# Patient Record
Sex: MALE | Race: WHITE | NOT HISPANIC OR LATINO | URBAN - METROPOLITAN AREA
[De-identification: names, ages, dates, MRNs, and addresses within clinical notes are randomized per-mention and may not be internally consistent; named-entity substitution may affect disease eponyms.]

---

## 2018-02-15 ENCOUNTER — OUTPATIENT (OUTPATIENT)
Dept: OUTPATIENT SERVICES | Facility: HOSPITAL | Age: 63
LOS: 1 days | Discharge: HOME | End: 2018-02-15

## 2018-02-15 DIAGNOSIS — M25.532 PAIN IN LEFT WRIST: ICD-10-CM

## 2019-03-19 PROBLEM — Z00.00 ENCOUNTER FOR PREVENTIVE HEALTH EXAMINATION: Status: ACTIVE | Noted: 2019-03-19

## 2019-04-04 ENCOUNTER — APPOINTMENT (OUTPATIENT)
Dept: CARDIOLOGY | Facility: CLINIC | Age: 64
End: 2019-04-04
Payer: COMMERCIAL

## 2019-04-04 VITALS
DIASTOLIC BLOOD PRESSURE: 90 MMHG | HEIGHT: 68 IN | SYSTOLIC BLOOD PRESSURE: 130 MMHG | WEIGHT: 219 LBS | BODY MASS INDEX: 33.19 KG/M2

## 2019-04-04 DIAGNOSIS — Z85.038 PERSONAL HISTORY OF OTHER MALIGNANT NEOPLASM OF LARGE INTESTINE: ICD-10-CM

## 2019-04-04 PROCEDURE — 99214 OFFICE O/P EST MOD 30 MIN: CPT

## 2019-04-04 NOTE — HISTORY OF PRESENT ILLNESS
[FreeTextEntry1] : pt is feeling well \par  meds reviwewd\par  no chest pain\par  f bs at home reviewed they are good 110 to130

## 2019-05-09 ENCOUNTER — EMERGENCY (EMERGENCY)
Facility: HOSPITAL | Age: 64
LOS: 0 days | Discharge: HOME | End: 2019-05-09
Attending: EMERGENCY MEDICINE | Admitting: EMERGENCY MEDICINE
Payer: COMMERCIAL

## 2019-05-09 VITALS
WEIGHT: 216.05 LBS | SYSTOLIC BLOOD PRESSURE: 148 MMHG | OXYGEN SATURATION: 97 % | DIASTOLIC BLOOD PRESSURE: 104 MMHG | TEMPERATURE: 97 F | HEIGHT: 68 IN | HEART RATE: 70 BPM | RESPIRATION RATE: 19 BRPM

## 2019-05-09 DIAGNOSIS — Z90.49 ACQUIRED ABSENCE OF OTHER SPECIFIED PARTS OF DIGESTIVE TRACT: Chronic | ICD-10-CM

## 2019-05-09 DIAGNOSIS — Z90.49 ACQUIRED ABSENCE OF OTHER SPECIFIED PARTS OF DIGESTIVE TRACT: ICD-10-CM

## 2019-05-09 DIAGNOSIS — Z85.038 PERSONAL HISTORY OF OTHER MALIGNANT NEOPLASM OF LARGE INTESTINE: ICD-10-CM

## 2019-05-09 DIAGNOSIS — L03.011 CELLULITIS OF RIGHT FINGER: ICD-10-CM

## 2019-05-09 DIAGNOSIS — M79.644 PAIN IN RIGHT FINGER(S): ICD-10-CM

## 2019-05-09 DIAGNOSIS — Z79.899 OTHER LONG TERM (CURRENT) DRUG THERAPY: ICD-10-CM

## 2019-05-09 DIAGNOSIS — Z79.2 LONG TERM (CURRENT) USE OF ANTIBIOTICS: ICD-10-CM

## 2019-05-09 DIAGNOSIS — Z79.84 LONG TERM (CURRENT) USE OF ORAL HYPOGLYCEMIC DRUGS: ICD-10-CM

## 2019-05-09 DIAGNOSIS — Z85.118 PERSONAL HISTORY OF OTHER MALIGNANT NEOPLASM OF BRONCHUS AND LUNG: ICD-10-CM

## 2019-05-09 DIAGNOSIS — I10 ESSENTIAL (PRIMARY) HYPERTENSION: ICD-10-CM

## 2019-05-09 DIAGNOSIS — Z79.82 LONG TERM (CURRENT) USE OF ASPIRIN: ICD-10-CM

## 2019-05-09 PROCEDURE — 99284 EMERGENCY DEPT VISIT MOD MDM: CPT

## 2019-05-09 RX ORDER — AZTREONAM 2 G
1 VIAL (EA) INJECTION
Qty: 20 | Refills: 0
Start: 2019-05-09 | End: 2019-05-18

## 2019-05-09 RX ORDER — AMPICILLIN SODIUM AND SULBACTAM SODIUM 250; 125 MG/ML; MG/ML
3 INJECTION, POWDER, FOR SUSPENSION INTRAMUSCULAR; INTRAVENOUS ONCE
Refills: 0 | Status: COMPLETED | OUTPATIENT
Start: 2019-05-09 | End: 2019-05-09

## 2019-05-09 RX ADMIN — AMPICILLIN SODIUM AND SULBACTAM SODIUM 200 GRAM(S): 250; 125 INJECTION, POWDER, FOR SUSPENSION INTRAMUSCULAR; INTRAVENOUS at 10:12

## 2019-05-09 NOTE — ED PROVIDER NOTE - OBJECTIVE STATEMENT
64 y/o NIDDM male presents with swelling and redness to right finger x few days. patient was gardening and treating wood in backyard prior to onset of symptoms. patient on keflex x 2 days without any improvement of symptoms. patient seen by plastic surgeon this am and was sent to the ED for IV antibx and procedure. patient denies any fever,chills, streaking up arm. patient c/o throbbing sensation. no spontaneous drainage from area.

## 2019-05-09 NOTE — ED PROVIDER NOTE - PHYSICAL EXAMINATION
msk: right hand no bony tenderness to fingers , no crepitation, good rom of digits    skin: right hand finger - +swelling , erythema with central papule, no crepitation    heme: no lymphangitis

## 2019-05-09 NOTE — ED PROVIDER NOTE - NS ED ROS FT
Review of Systems    Constitutional: (-) fever or chills  respiratory: (-) cough (-) shortness of breath  Cardiovascular: (-) syncope, palpitations or chest pain  Integumentary: (+) redness and throbbing to fingertip  Neurological: (-) altered mental status, headache or head injury  heme: no streaking or lymphnodes

## 2019-05-09 NOTE — ED PROVIDER NOTE - CARE PROVIDER_API CALL
Dc Lopez (DO)  Plastic Surgery  [address] [place] [address]  Phone: [phone]  Fax: [phone]  Follow Up Time:

## 2019-05-09 NOTE — ED ADULT NURSE NOTE - NSIMPLEMENTINTERV_GEN_ALL_ED
Implemented All Universal Safety Interventions:  [place] to call system. Call bell, personal items and telephone within reach. Instruct patient to call for assistance. Room bathroom lighting operational. Non-slip footwear when patient is off stretcher. Physically safe environment: no spills, clutter or unnecessary equipment. Stretcher in lowest position, wheels locked, appropriate side rails in place.

## 2019-05-09 NOTE — ED PROVIDER NOTE - NSFOLLOWUPINSTRUCTIONS_ED_ALL_ED_FT
Fingertip Infection    There are two main types of fingertip infections:  Paronychia. This is an infection that happens around your nail. This type of infection can start suddenly in one nail or occur gradually over time and affect more than one nail. Long-term (chronic) paronychia can make your fingernails thick and deformed.  Felon. This is a bacterial infection in the padded tip of your finger. Felon infection can cause a painful collection of pus (abscess) to form inside your fingertip. If the infection is not treated, the infection can spread as deep as the bone.  What are the causes?  Paronychia infection can be caused by bacteria, funguses, or a mix of both. Felon infection is usually caused by the bacteria that are normally found on your skin. An infection can develop if the bacteria spread through your skin to the pad of tissue inside your fingertip.    What increases the risk?  A fingertip infection is more likely to develop in people:  Who have diabetes.  Who have a weak body defense (immune) system.  Who work with their hands.  Whose hands are exposed to moisture, chemicals, or irritants for long periods of time.  Who have poor circulation.  Who bite, chew, or pick their fingernails.  What are the signs or symptoms?  Symptoms of paronychia may affect one or more fingernails and include:  Pain, swelling, and redness around the nail.  Pus-filled pockets at the base or side of the fingernail (cuticle).  Thick fingernails that separate from the nail bed.  Pus that drains from the nail bed.  Symptoms of felon usually affect just one fingertip pad and include:  Severe, throbbing pain.  Redness.  Swelling.  Warmth.  Tenderness when the affected fingertip is touched.  How is this diagnosed?  A fingertip infection is diagnosed with a medical history and physical exam. If there is pus draining from the infection, it may be swabbed and sent to the lab for a culture. An X-ray may be done to see if the infection has spread to the bone.    How is this treated?  Treatment for a fingertip infection may include:  Warm water or salt–water soaks several times per day.  Antibiotic medicine. This may be an ointment or pills.  Steroid ointment.  Antifungal pills.  Drainage of pus pockets. This is done by making a surgical cut (incision) to open the fingertip to drain pus.  Wearing gloves to protect your nails.  Follow these instructions at home:  Medicines     Take or apply over-the-counter and prescription medicines only as told by your health care provider.  If you were prescribed antibiotic medicine, take or apply it as told by your health care provider. Do not stop using the antibiotic even if your condition improves.  Wound care     Clean the infected area each day with warm water or salt water, or as told by your health care provider.  Gently wash the infected area with mild soap and water.  Rinse the infected area with water to remove all soap.  Pat the infected area dry with a clean towel. Do not rub it.  To make a salt–water mixture, completely dissolve ½–1 tsp of salt in 1 cup of warm water.  Follow instructions from your health care provider about:  How to take care of the infection.  When and how you should change your bandage (dressing).  When you should remove your dressing.  Check the infected area every day for more signs of infection. Watch for:  More redness, swelling, or pain.  More fluid or blood.  Warmth.  A bad smell.  General instructions     Keep the dressing dry until your health care provider says it can be removed. Do not take baths, swim, use a hot tub, or do anything that would put your wound underwater until your health care provider approves.  Raise (elevate) the infected area above the level of your heart while you are sitting or lying down or as told by your health care provider.  Do not scratch or pick at the infection.  Wear gloves as told by your health care provider, if this applies.  Keep all follow-up visits as told by your health care provider. This is important.  How is this prevented?  Wear gloves when you work with your hands.  Wash your hands often with antibacterial soap.  Avoid letting your hands stay wet or irritated for long periods of time.  Do not bite your fingernails. Do not pull on your cuticles. Do not suck on your fingers.  Use clean scissors or nail clippers to trim your nails. Do not cut your fingernails very short.  Contact a health care provider if:  Your pain medicine is not helping.  You have more redness, swelling, or pain at your fingertip.  You continue to have fluid, blood, or pus coming from your fingertip.  Your infection area feels warm to the touch.  You continue to notice a bad smell coming from your fingertip or your dressing.  Get help right away if:  The area of redness is spreading, or you notice a red streak going away from your fingertip.  You have a fever.  This information is not intended to replace advice given to you by your health care provider. Make sure you discuss any questions you have with your health care provider.

## 2019-05-13 LAB
-  AMIKACIN: SIGNIFICANT CHANGE UP
-  AZTREONAM: SIGNIFICANT CHANGE UP
-  CEFEPIME: SIGNIFICANT CHANGE UP
-  CEFTRIAXONE: SIGNIFICANT CHANGE UP
-  CIPROFLOXACIN: SIGNIFICANT CHANGE UP
-  GENTAMICIN: SIGNIFICANT CHANGE UP
-  LEVOFLOXACIN: SIGNIFICANT CHANGE UP
-  MEROPENEM: SIGNIFICANT CHANGE UP
-  PIPERACILLIN/TAZOBACTAM: SIGNIFICANT CHANGE UP
-  TOBRAMYCIN: SIGNIFICANT CHANGE UP
-  TRIMETHOPRIM/SULFAMETHOXAZOLE: SIGNIFICANT CHANGE UP
METHOD TYPE: SIGNIFICANT CHANGE UP

## 2019-05-14 LAB
-  AMPICILLIN/SULBACTAM: SIGNIFICANT CHANGE UP
-  CEFAZOLIN: SIGNIFICANT CHANGE UP
-  CLINDAMYCIN: SIGNIFICANT CHANGE UP
-  ERYTHROMYCIN: SIGNIFICANT CHANGE UP
-  GENTAMICIN: SIGNIFICANT CHANGE UP
-  OXACILLIN: SIGNIFICANT CHANGE UP
-  PENICILLIN: SIGNIFICANT CHANGE UP
-  RIFAMPIN: SIGNIFICANT CHANGE UP
-  TETRACYCLINE: SIGNIFICANT CHANGE UP
-  TRIMETHOPRIM/SULFAMETHOXAZOLE: SIGNIFICANT CHANGE UP
-  VANCOMYCIN: SIGNIFICANT CHANGE UP
CULTURE RESULTS: SIGNIFICANT CHANGE UP
METHOD TYPE: SIGNIFICANT CHANGE UP
ORGANISM # SPEC MICROSCOPIC CNT: SIGNIFICANT CHANGE UP
SPECIMEN SOURCE: SIGNIFICANT CHANGE UP

## 2019-05-14 NOTE — ED POST DISCHARGE NOTE - RESULT SUMMARY
ABSCESS CX FINGER- FINGER HEALING WELL. WILL SEE SURGEON TOMORROW TO DECIDE IF ABX NEED TO BE CHANGED. CX DISCUSSED, PATIENT DOING MUCH BETTER.

## 2019-06-25 ENCOUNTER — OUTPATIENT (OUTPATIENT)
Dept: OUTPATIENT SERVICES | Facility: HOSPITAL | Age: 64
LOS: 1 days | Discharge: HOME | End: 2019-06-25

## 2019-06-25 ENCOUNTER — RX RENEWAL (OUTPATIENT)
Age: 64
End: 2019-06-25

## 2019-06-25 DIAGNOSIS — I10 ESSENTIAL (PRIMARY) HYPERTENSION: ICD-10-CM

## 2019-06-25 DIAGNOSIS — E11.9 TYPE 2 DIABETES MELLITUS WITHOUT COMPLICATIONS: ICD-10-CM

## 2019-06-25 DIAGNOSIS — Z90.49 ACQUIRED ABSENCE OF OTHER SPECIFIED PARTS OF DIGESTIVE TRACT: Chronic | ICD-10-CM

## 2019-06-25 DIAGNOSIS — Z00.00 ENCOUNTER FOR GENERAL ADULT MEDICAL EXAMINATION WITHOUT ABNORMAL FINDINGS: ICD-10-CM

## 2019-06-25 DIAGNOSIS — E78.5 HYPERLIPIDEMIA, UNSPECIFIED: ICD-10-CM

## 2019-06-26 LAB
ALBUMIN SERPL ELPH-MCNC: 4.7 G/DL
ALP BLD-CCNC: 66 U/L
ALT SERPL-CCNC: 26 U/L
ANION GAP SERPL CALC-SCNC: 15 MMOL/L
APPEARANCE: ABNORMAL
AST SERPL-CCNC: 20 U/L
BACTERIA: NEGATIVE
BASOPHILS # BLD AUTO: 0.08 K/UL
BASOPHILS NFR BLD AUTO: 1.2 %
BILIRUB SERPL-MCNC: 0.4 MG/DL
BILIRUBIN URINE: NEGATIVE
BLOOD URINE: NEGATIVE
BUN SERPL-MCNC: 25 MG/DL
CALCIUM SERPL-MCNC: 9.6 MG/DL
CHLORIDE SERPL-SCNC: 104 MMOL/L
CHOLEST SERPL-MCNC: 167 MG/DL
CHOLEST/HDLC SERPL: 3.6 RATIO
CO2 SERPL-SCNC: 24 MMOL/L
COLOR: YELLOW
CREAT SERPL-MCNC: 1.1 MG/DL
CREAT SPEC-SCNC: 159 MG/DL
EOSINOPHIL # BLD AUTO: 0.21 K/UL
EOSINOPHIL NFR BLD AUTO: 3.1 %
ESTIMATED AVERAGE GLUCOSE: 114 MG/DL
GLUCOSE QUALITATIVE U: NEGATIVE
GLUCOSE SERPL-MCNC: 95 MG/DL
HBA1C MFR BLD HPLC: 5.6 %
HCT VFR BLD CALC: 46.5 %
HDLC SERPL-MCNC: 46 MG/DL
HGB BLD-MCNC: 15.6 G/DL
HYALINE CASTS: 47 /LPF
IMM GRANULOCYTES NFR BLD AUTO: 0.4 %
KETONES URINE: NEGATIVE
LDLC SERPL CALC-MCNC: 115 MG/DL
LEUKOCYTE ESTERASE URINE: NEGATIVE
LYMPHOCYTES # BLD AUTO: 2.41 K/UL
LYMPHOCYTES NFR BLD AUTO: 36 %
MAN DIFF?: NORMAL
MCHC RBC-ENTMCNC: 29.2 PG
MCHC RBC-ENTMCNC: 33.5 G/DL
MCV RBC AUTO: 86.9 FL
MICROALBUMIN 24H UR DL<=1MG/L-MCNC: 195.8 MG/DL
MICROALBUMIN/CREAT 24H UR-RTO: 1229 MG/G
MICROSCOPIC-UA: NORMAL
MONOCYTES # BLD AUTO: 0.58 K/UL
MONOCYTES NFR BLD AUTO: 8.7 %
NEUTROPHILS # BLD AUTO: 3.38 K/UL
NEUTROPHILS NFR BLD AUTO: 50.6 %
NITRITE URINE: NEGATIVE
PH URINE: 5.5
PLATELET # BLD AUTO: 223 K/UL
POTASSIUM SERPL-SCNC: 4 MMOL/L
PROT SERPL-MCNC: 7 G/DL
PROTEIN URINE: ABNORMAL
PSA SERPL-MCNC: 1.58 NG/ML
RBC # BLD: 5.35 M/UL
RBC # FLD: 13.1 %
RED BLOOD CELLS URINE: 0 /HPF
SODIUM SERPL-SCNC: 143 MMOL/L
SPECIFIC GRAVITY URINE: 1.03
SQUAMOUS EPITHELIAL CELLS: 1 /HPF
TRIGL SERPL-MCNC: 113 MG/DL
UROBILINOGEN URINE: NORMAL
WBC # FLD AUTO: 6.69 K/UL
WHITE BLOOD CELLS URINE: 1 /HPF

## 2019-07-09 ENCOUNTER — APPOINTMENT (OUTPATIENT)
Dept: CARDIOLOGY | Facility: CLINIC | Age: 64
End: 2019-07-09
Payer: COMMERCIAL

## 2019-07-09 VITALS
WEIGHT: 220 LBS | DIASTOLIC BLOOD PRESSURE: 70 MMHG | SYSTOLIC BLOOD PRESSURE: 118 MMHG | BODY MASS INDEX: 33.34 KG/M2 | HEIGHT: 68 IN

## 2019-07-09 PROCEDURE — 99213 OFFICE O/P EST LOW 20 MIN: CPT

## 2019-07-09 NOTE — REASON FOR VISIT
[Hyperlipidemia] : hyperlipidemia [Follow-Up - Clinic] : a clinic follow-up of [Hypertension] : hypertension

## 2019-07-09 NOTE — HISTORY OF PRESENT ILLNESS
[FreeTextEntry1] :  pt is feeling well\par  nochest pains\par  diabetes is better hgb a1c 5.6\par  cbc amp and lipids done on 6/25 are wnl\par

## 2019-07-09 NOTE — PHYSICAL EXAM
[General Appearance - Well Developed] : well developed [Normal Appearance] : normal appearance [General Appearance - Well Nourished] : well nourished [Well Groomed] : well groomed [No Deformities] : no deformities [General Appearance - In No Acute Distress] : no acute distress

## 2019-10-17 ENCOUNTER — APPOINTMENT (OUTPATIENT)
Dept: CARDIOLOGY | Facility: CLINIC | Age: 64
End: 2019-10-17
Payer: COMMERCIAL

## 2019-10-17 ENCOUNTER — RX RENEWAL (OUTPATIENT)
Age: 64
End: 2019-10-17

## 2019-10-17 VITALS
BODY MASS INDEX: 34.1 KG/M2 | WEIGHT: 225 LBS | HEIGHT: 68 IN | SYSTOLIC BLOOD PRESSURE: 132 MMHG | DIASTOLIC BLOOD PRESSURE: 85 MMHG

## 2019-10-17 PROCEDURE — 93000 ELECTROCARDIOGRAM COMPLETE: CPT

## 2019-10-17 PROCEDURE — 99213 OFFICE O/P EST LOW 20 MIN: CPT

## 2019-10-17 NOTE — PHYSICAL EXAM
[General Appearance - Well Developed] : well developed [Normal Appearance] : normal appearance [Well Groomed] : well groomed [No Deformities] : no deformities [General Appearance - Well Nourished] : well nourished [General Appearance - In No Acute Distress] : no acute distress [Heart Rate And Rhythm] : heart rate and rhythm were normal [Heart Sounds] : normal S1 and S2 [Murmurs] : no murmurs present [Arterial Pulses Normal] : the arterial pulses were normal [Edema] : no peripheral edema present [Veins - Varicosity Changes] : no varicosital changes were noted in the lower extremities

## 2019-10-17 NOTE — ASSESSMENT
[FreeTextEntry1] : e k g nsr wnl\par  bp is well controlled\par  meds reviewed\par  need s blood work

## 2019-10-18 RX ORDER — AMLODIPINE BESYLATE 5 MG/1
5 TABLET ORAL TWICE DAILY
Qty: 180 | Refills: 2 | Status: DISCONTINUED | COMMUNITY
Start: 2019-10-17 | End: 2019-10-18

## 2019-10-18 RX ORDER — AMLODIPINE BESYLATE 5 MG/1
5 TABLET ORAL
Qty: 90 | Refills: 2 | Status: DISCONTINUED | COMMUNITY
End: 2019-10-18

## 2019-12-26 ENCOUNTER — RX RENEWAL (OUTPATIENT)
Age: 64
End: 2019-12-26

## 2019-12-30 ENCOUNTER — LABORATORY RESULT (OUTPATIENT)
Age: 64
End: 2019-12-30

## 2019-12-30 ENCOUNTER — OUTPATIENT (OUTPATIENT)
Dept: OUTPATIENT SERVICES | Facility: HOSPITAL | Age: 64
LOS: 1 days | Discharge: HOME | End: 2019-12-30

## 2019-12-30 DIAGNOSIS — I10 ESSENTIAL (PRIMARY) HYPERTENSION: ICD-10-CM

## 2019-12-30 DIAGNOSIS — Z00.00 ENCOUNTER FOR GENERAL ADULT MEDICAL EXAMINATION WITHOUT ABNORMAL FINDINGS: ICD-10-CM

## 2019-12-30 DIAGNOSIS — E78.5 HYPERLIPIDEMIA, UNSPECIFIED: ICD-10-CM

## 2019-12-30 DIAGNOSIS — Z90.49 ACQUIRED ABSENCE OF OTHER SPECIFIED PARTS OF DIGESTIVE TRACT: Chronic | ICD-10-CM

## 2019-12-30 DIAGNOSIS — C18.9 MALIGNANT NEOPLASM OF COLON, UNSPECIFIED: ICD-10-CM

## 2020-01-09 ENCOUNTER — APPOINTMENT (OUTPATIENT)
Dept: CARDIOLOGY | Facility: CLINIC | Age: 65
End: 2020-01-09
Payer: COMMERCIAL

## 2020-01-09 VITALS
HEIGHT: 68 IN | DIASTOLIC BLOOD PRESSURE: 70 MMHG | BODY MASS INDEX: 35.16 KG/M2 | WEIGHT: 232 LBS | SYSTOLIC BLOOD PRESSURE: 123 MMHG

## 2020-01-09 PROCEDURE — 99213 OFFICE O/P EST LOW 20 MIN: CPT

## 2020-01-09 NOTE — ASSESSMENT
[FreeTextEntry1] :  diabetes is well controlled\par  cholesterol is still high will increase pravastatin 40\par  pt brither is diagnosed with ca prostate will check psa

## 2020-01-09 NOTE — PHYSICAL EXAM
[General Appearance - Well Developed] : well developed [Normal Appearance] : normal appearance [Well Groomed] : well groomed [General Appearance - Well Nourished] : well nourished [No Deformities] : no deformities [Heart Rate And Rhythm] : heart rate and rhythm were normal [General Appearance - In No Acute Distress] : no acute distress [Heart Sounds] : normal S1 and S2 [Murmurs] : no murmurs present

## 2020-01-09 NOTE — HISTORY OF PRESENT ILLNESS
[FreeTextEntry1] : pt is feeling well\par  no chest pains\par  blood work 12/30/19 revirwd wnl\par  hgba1c 5.9 diabetes is well controlled

## 2020-04-09 ENCOUNTER — APPOINTMENT (OUTPATIENT)
Dept: CARDIOLOGY | Facility: CLINIC | Age: 65
End: 2020-04-09

## 2020-06-23 ENCOUNTER — RX RENEWAL (OUTPATIENT)
Age: 65
End: 2020-06-23

## 2020-09-03 ENCOUNTER — LABORATORY RESULT (OUTPATIENT)
Age: 65
End: 2020-09-03

## 2020-09-03 LAB
ALBUMIN SERPL ELPH-MCNC: 4.8 G/DL
ALP BLD-CCNC: 62 U/L
ALT SERPL-CCNC: 59 U/L
ANION GAP SERPL CALC-SCNC: 19 MMOL/L
AST SERPL-CCNC: 39 U/L
BASOPHILS # BLD AUTO: 0.07 K/UL
BASOPHILS NFR BLD AUTO: 1 %
BILIRUB SERPL-MCNC: 0.5 MG/DL
BUN SERPL-MCNC: 27 MG/DL
CALCIUM SERPL-MCNC: 10.3 MG/DL
CHLORIDE SERPL-SCNC: 99 MMOL/L
CHOLEST SERPL-MCNC: 214 MG/DL
CHOLEST/HDLC SERPL: 4.5 RATIO
CO2 SERPL-SCNC: 25 MMOL/L
CREAT SERPL-MCNC: 1.4 MG/DL
EOSINOPHIL # BLD AUTO: 0.29 K/UL
EOSINOPHIL NFR BLD AUTO: 4 %
GLUCOSE SERPL-MCNC: 91 MG/DL
HCT VFR BLD CALC: 50.6 %
HDLC SERPL-MCNC: 48 MG/DL
HGB BLD-MCNC: 16.7 G/DL
IMM GRANULOCYTES NFR BLD AUTO: 0.4 %
LDLC SERPL CALC-MCNC: 147 MG/DL
LYMPHOCYTES # BLD AUTO: 2.47 K/UL
LYMPHOCYTES NFR BLD AUTO: 34.2 %
MAN DIFF?: NORMAL
MCHC RBC-ENTMCNC: 29.1 PG
MCHC RBC-ENTMCNC: 33 G/DL
MCV RBC AUTO: 88.3 FL
MONOCYTES # BLD AUTO: 0.65 K/UL
MONOCYTES NFR BLD AUTO: 9 %
NEUTROPHILS # BLD AUTO: 3.71 K/UL
NEUTROPHILS NFR BLD AUTO: 51.4 %
PLATELET # BLD AUTO: 231 K/UL
POTASSIUM SERPL-SCNC: 4.3 MMOL/L
PROT SERPL-MCNC: 7.1 G/DL
RBC # BLD: 5.73 M/UL
RBC # FLD: 13.2 %
SODIUM SERPL-SCNC: 143 MMOL/L
TRIGL SERPL-MCNC: 153 MG/DL
WBC # FLD AUTO: 7.22 K/UL

## 2020-09-04 LAB
ESTIMATED AVERAGE GLUCOSE: 117 MG/DL
HBA1C MFR BLD HPLC: 5.7 %
PSA SERPL-MCNC: 1.86 NG/ML

## 2020-09-08 ENCOUNTER — RX RENEWAL (OUTPATIENT)
Age: 65
End: 2020-09-08

## 2020-09-14 ENCOUNTER — RX RENEWAL (OUTPATIENT)
Age: 65
End: 2020-09-14

## 2020-09-24 ENCOUNTER — APPOINTMENT (OUTPATIENT)
Dept: CARDIOLOGY | Facility: CLINIC | Age: 65
End: 2020-09-24
Payer: COMMERCIAL

## 2020-09-24 VITALS
WEIGHT: 220 LBS | TEMPERATURE: 97.3 F | BODY MASS INDEX: 33.45 KG/M2 | DIASTOLIC BLOOD PRESSURE: 70 MMHG | SYSTOLIC BLOOD PRESSURE: 118 MMHG

## 2020-09-24 PROCEDURE — 93000 ELECTROCARDIOGRAM COMPLETE: CPT

## 2020-09-24 PROCEDURE — 99213 OFFICE O/P EST LOW 20 MIN: CPT

## 2020-09-24 NOTE — HISTORY OF PRESENT ILLNESS
[FreeTextEntry1] : pt is feeling well\par  fbs 91\par  blood work from 9/3/20 reviewed hgba1c 5.7 well controlled\par  cholesterol elevated 213 ldl 143\par  medsd reviewed\par

## 2020-09-24 NOTE — ASSESSMENT
[FreeTextEntry1] : pt is feeling well\par  diabetes is better\par  htn well controlled\par  e k g nsr wnl\par  meds renewed

## 2020-10-05 ENCOUNTER — RX RENEWAL (OUTPATIENT)
Age: 65
End: 2020-10-05

## 2021-01-07 ENCOUNTER — RX RENEWAL (OUTPATIENT)
Age: 66
End: 2021-01-07

## 2021-01-13 LAB
ALBUMIN SERPL ELPH-MCNC: 4.8 G/DL
ALP BLD-CCNC: 65 U/L
ALT SERPL-CCNC: 45 U/L
AST SERPL-CCNC: 27 U/L
BILIRUB DIRECT SERPL-MCNC: <0.2 MG/DL
BILIRUB INDIRECT SERPL-MCNC: >0.3 MG/DL
BILIRUB SERPL-MCNC: 0.5 MG/DL
CHOLEST SERPL-MCNC: 183 MG/DL
ESTIMATED AVERAGE GLUCOSE: 120 MG/DL
HBA1C MFR BLD HPLC: 5.8 %
HDLC SERPL-MCNC: 48 MG/DL
LDLC SERPL CALC-MCNC: 112 MG/DL
NONHDLC SERPL-MCNC: 135 MG/DL
PROT SERPL-MCNC: 7.2 G/DL
SARS-COV-2 IGG SERPL IA-ACNC: <0.1 INDEX
SARS-COV-2 IGG SERPL QL IA: NEGATIVE
TRIGL SERPL-MCNC: 172 MG/DL

## 2021-01-31 ENCOUNTER — EMERGENCY (EMERGENCY)
Facility: HOSPITAL | Age: 66
LOS: 0 days | Discharge: HOME | End: 2021-01-31
Attending: EMERGENCY MEDICINE | Admitting: EMERGENCY MEDICINE
Payer: COMMERCIAL

## 2021-01-31 ENCOUNTER — TRANSCRIPTION ENCOUNTER (OUTPATIENT)
Age: 66
End: 2021-01-31

## 2021-01-31 VITALS
HEIGHT: 68 IN | WEIGHT: 212.08 LBS | OXYGEN SATURATION: 96 % | DIASTOLIC BLOOD PRESSURE: 71 MMHG | TEMPERATURE: 98 F | RESPIRATION RATE: 18 BRPM | HEART RATE: 79 BPM | SYSTOLIC BLOOD PRESSURE: 108 MMHG

## 2021-01-31 DIAGNOSIS — R53.81 OTHER MALAISE: ICD-10-CM

## 2021-01-31 DIAGNOSIS — Z87.891 PERSONAL HISTORY OF NICOTINE DEPENDENCE: ICD-10-CM

## 2021-01-31 DIAGNOSIS — Z20.822 CONTACT WITH AND (SUSPECTED) EXPOSURE TO COVID-19: ICD-10-CM

## 2021-01-31 DIAGNOSIS — B34.9 VIRAL INFECTION, UNSPECIFIED: ICD-10-CM

## 2021-01-31 DIAGNOSIS — Z90.49 ACQUIRED ABSENCE OF OTHER SPECIFIED PARTS OF DIGESTIVE TRACT: Chronic | ICD-10-CM

## 2021-01-31 DIAGNOSIS — Z79.82 LONG TERM (CURRENT) USE OF ASPIRIN: ICD-10-CM

## 2021-01-31 DIAGNOSIS — Z85.038 PERSONAL HISTORY OF OTHER MALIGNANT NEOPLASM OF LARGE INTESTINE: ICD-10-CM

## 2021-01-31 DIAGNOSIS — I10 ESSENTIAL (PRIMARY) HYPERTENSION: ICD-10-CM

## 2021-01-31 DIAGNOSIS — E11.9 TYPE 2 DIABETES MELLITUS WITHOUT COMPLICATIONS: ICD-10-CM

## 2021-01-31 DIAGNOSIS — Z79.84 LONG TERM (CURRENT) USE OF ORAL HYPOGLYCEMIC DRUGS: ICD-10-CM

## 2021-01-31 DIAGNOSIS — Z85.118 PERSONAL HISTORY OF OTHER MALIGNANT NEOPLASM OF BRONCHUS AND LUNG: ICD-10-CM

## 2021-01-31 DIAGNOSIS — Z79.2 LONG TERM (CURRENT) USE OF ANTIBIOTICS: ICD-10-CM

## 2021-01-31 PROCEDURE — 99284 EMERGENCY DEPT VISIT MOD MDM: CPT

## 2021-01-31 NOTE — ED PROVIDER NOTE - PATIENT PORTAL LINK FT
[place] grid at home. MVI/AREDS discussed. Patient to call if any changes in vision or grid card. You can access the FollowMyHealth Patient Portal offered by [facility] by registering at the following website: [email or web address]. By joining [facility] FollowMyHealth portal, you will also be able to view your health information using other applications (apps) compatible with our system.

## 2021-01-31 NOTE — ED PROVIDER NOTE - NSFOLLOWUPINSTRUCTIONS_ED_ALL_ED_FT
Information for COVID-19    You are being discharged with viral illness diagnosis and do not require hospitalization.  At this time, only patients who are being hospitalized are tested for COVID-19.    If you are well enough to be discharged home and are not in a high risk group to be admitted, you should care for yourself at home exactly like you would if you have Influenza “flu”. Follow all the standard guidelines about washing your hands, covering your cough, etc. If you feel unwell, stay home, rest and drink plenty of clear fluids. Keep track of your symptoms.    You do need to remain home for at least 7 days from the onset of symptoms or 3 days after your fever is completely gone and your respiratory symptoms are better, whichever is longer. You should continue to isolate yourself.     If symptoms worsen or continue and you need to seek medical care, call your healthcare provider in advance, or 9-1-1 in an emergency, and let them know you are a close contact to a person with confirmed COVID-19    You should return to the Emergency Department if you develop worse symptoms, trouble breathing, chest pain, and/or a fever that doesn’t improve with over the counter medications.    Please consider going through the drive-through testing unless you are severely ill and need to go to the ED.    -through testing is available at various location, including [place].  Call [facility] at  [phone] to make an appointment.    How to Set Up Your Home for Self-Quarantine or Self-Isolation    Please refer to this helpful video.    [email or web address]

## 2021-01-31 NOTE — ED PROVIDER NOTE - OBJECTIVE STATEMENT
64 yo male hx of HTN/DM/Lung/colon cancer in remission present c/o malaise/ rhinorrhea/subjective fever/chill over the past few days. Wife has similar symptoms at home. Received 1st dose of COVID vaccine 2.5 weeks ago.   Denies fever/chill/chest pain/palpitation/sob/abd pain/n/v/d/ black stool/bloody stool/urinary sxs

## 2021-01-31 NOTE — ED PROVIDER NOTE - ATTENDING CONTRIBUTION TO CARE
I personally evaluated the patient. I reviewed the Resident’s or Physician Assistant’s note (as assigned above), and agree with the findings and plan except as documented in my note.  Chart reviewed.  H/O colon CA, lung CA, HTN, complains of congestion and malaise. Received Moderna vaccine 2.5 weeks ago.  Exam unremarkable.

## 2021-01-31 NOTE — ED PROVIDER NOTE - NS ED ROS FT
Constitutional: see HPI  Eyes: no redness/discharge/pain/vision changes  ENT: + rhinorrhea No ear pain/sore throat  Cardiac: No chest pain, SOB or edema.  Respiratory: No cough or respiratory distress  GI: No nausea, vomiting, diarrhea or abdominal pain.  : No dysuria, frequency, urgency or hematuria  MS: no pain to back or extremities, no loss of ROM, no weakness  Neuro: No headache or weakness. No LOC.  Skin: No skin rash.  Endocrine: + diabetes.

## 2021-02-01 LAB — SARS-COV-2 RNA SPEC QL NAA+PROBE: DETECTED

## 2021-02-05 ENCOUNTER — APPOINTMENT (OUTPATIENT)
Dept: CARDIOLOGY | Facility: CLINIC | Age: 66
End: 2021-02-05

## 2021-02-07 ENCOUNTER — INPATIENT (INPATIENT)
Facility: HOSPITAL | Age: 66
LOS: 1 days | Discharge: HOME | End: 2021-02-09
Attending: HOSPITALIST | Admitting: HOSPITALIST
Payer: COMMERCIAL

## 2021-02-07 VITALS — WEIGHT: 229.94 LBS

## 2021-02-07 DIAGNOSIS — E78.5 HYPERLIPIDEMIA, UNSPECIFIED: ICD-10-CM

## 2021-02-07 DIAGNOSIS — E11.9 TYPE 2 DIABETES MELLITUS WITHOUT COMPLICATIONS: ICD-10-CM

## 2021-02-07 DIAGNOSIS — Z90.2 ACQUIRED ABSENCE OF LUNG [PART OF]: Chronic | ICD-10-CM

## 2021-02-07 DIAGNOSIS — N28.9 DISORDER OF KIDNEY AND URETER, UNSPECIFIED: ICD-10-CM

## 2021-02-07 DIAGNOSIS — R10.13 EPIGASTRIC PAIN: ICD-10-CM

## 2021-02-07 DIAGNOSIS — K82.8 OTHER SPECIFIED DISEASES OF GALLBLADDER: ICD-10-CM

## 2021-02-07 DIAGNOSIS — U07.1 COVID-19: ICD-10-CM

## 2021-02-07 DIAGNOSIS — Z90.49 ACQUIRED ABSENCE OF OTHER SPECIFIED PARTS OF DIGESTIVE TRACT: Chronic | ICD-10-CM

## 2021-02-07 DIAGNOSIS — I10 ESSENTIAL (PRIMARY) HYPERTENSION: ICD-10-CM

## 2021-02-07 LAB
ALBUMIN SERPL ELPH-MCNC: 4.7 G/DL — SIGNIFICANT CHANGE UP (ref 3.5–5.2)
ALP SERPL-CCNC: 82 U/L — SIGNIFICANT CHANGE UP (ref 30–115)
ALT FLD-CCNC: 39 U/L — SIGNIFICANT CHANGE UP (ref 0–41)
ANION GAP SERPL CALC-SCNC: 15 MMOL/L — HIGH (ref 7–14)
AST SERPL-CCNC: 30 U/L — SIGNIFICANT CHANGE UP (ref 0–41)
BASE EXCESS BLDV CALC-SCNC: 3.9 MMOL/L — HIGH (ref -2–2)
BASOPHILS # BLD AUTO: 0.09 K/UL — SIGNIFICANT CHANGE UP (ref 0–0.2)
BASOPHILS NFR BLD AUTO: 1 % — SIGNIFICANT CHANGE UP (ref 0–1)
BILIRUB SERPL-MCNC: 0.5 MG/DL — SIGNIFICANT CHANGE UP (ref 0.2–1.2)
BUN SERPL-MCNC: 25 MG/DL — HIGH (ref 10–20)
CA-I SERPL-SCNC: 1.28 MMOL/L — SIGNIFICANT CHANGE UP (ref 1.12–1.3)
CALCIUM SERPL-MCNC: 11.3 MG/DL — HIGH (ref 8.5–10.1)
CHLORIDE SERPL-SCNC: 102 MMOL/L — SIGNIFICANT CHANGE UP (ref 98–110)
CO2 SERPL-SCNC: 27 MMOL/L — SIGNIFICANT CHANGE UP (ref 17–32)
CREAT SERPL-MCNC: 1.8 MG/DL — HIGH (ref 0.7–1.5)
EOSINOPHIL # BLD AUTO: 0.12 K/UL — SIGNIFICANT CHANGE UP (ref 0–0.7)
EOSINOPHIL NFR BLD AUTO: 1.3 % — SIGNIFICANT CHANGE UP (ref 0–8)
GAS PNL BLDV: 144 MMOL/L — SIGNIFICANT CHANGE UP (ref 136–145)
GAS PNL BLDV: SIGNIFICANT CHANGE UP
GLUCOSE SERPL-MCNC: 192 MG/DL — HIGH (ref 70–99)
HCO3 BLDV-SCNC: 32 MMOL/L — HIGH (ref 22–29)
HCT VFR BLD CALC: 47.7 % — SIGNIFICANT CHANGE UP (ref 42–52)
HCT VFR BLDA CALC: 44.3 % — HIGH (ref 34–44)
HGB BLD CALC-MCNC: 14.5 G/DL — SIGNIFICANT CHANGE UP (ref 14–18)
HGB BLD-MCNC: 16 G/DL — SIGNIFICANT CHANGE UP (ref 14–18)
HOROWITZ INDEX BLDV+IHG-RTO: 21 — SIGNIFICANT CHANGE UP
IMM GRANULOCYTES NFR BLD AUTO: 1.8 % — HIGH (ref 0.1–0.3)
LACTATE BLDV-MCNC: 3.3 MMOL/L — HIGH (ref 0.5–1.6)
LACTATE SERPL-SCNC: 3.4 MMOL/L — HIGH (ref 0.7–2)
LIDOCAIN IGE QN: 63 U/L — HIGH (ref 7–60)
LYMPHOCYTES # BLD AUTO: 1.92 K/UL — SIGNIFICANT CHANGE UP (ref 1.2–3.4)
LYMPHOCYTES # BLD AUTO: 20.6 % — SIGNIFICANT CHANGE UP (ref 20.5–51.1)
MAGNESIUM SERPL-MCNC: 1.8 MG/DL — SIGNIFICANT CHANGE UP (ref 1.8–2.4)
MCHC RBC-ENTMCNC: 28.7 PG — SIGNIFICANT CHANGE UP (ref 27–31)
MCHC RBC-ENTMCNC: 33.5 G/DL — SIGNIFICANT CHANGE UP (ref 32–37)
MCV RBC AUTO: 85.6 FL — SIGNIFICANT CHANGE UP (ref 80–94)
MONOCYTES # BLD AUTO: 0.53 K/UL — SIGNIFICANT CHANGE UP (ref 0.1–0.6)
MONOCYTES NFR BLD AUTO: 5.7 % — SIGNIFICANT CHANGE UP (ref 1.7–9.3)
NEUTROPHILS # BLD AUTO: 6.51 K/UL — HIGH (ref 1.4–6.5)
NEUTROPHILS NFR BLD AUTO: 69.6 % — SIGNIFICANT CHANGE UP (ref 42.2–75.2)
NRBC # BLD: 0 /100 WBCS — SIGNIFICANT CHANGE UP (ref 0–0)
PCO2 BLDV: 62 MMHG — HIGH (ref 41–51)
PH BLDV: 7.32 — SIGNIFICANT CHANGE UP (ref 7.26–7.43)
PLATELET # BLD AUTO: 294 K/UL — SIGNIFICANT CHANGE UP (ref 130–400)
PO2 BLDV: 38 MMHG — SIGNIFICANT CHANGE UP (ref 20–40)
POTASSIUM BLDV-SCNC: 3.1 MMOL/L — LOW (ref 3.3–5.6)
POTASSIUM SERPL-MCNC: 3.6 MMOL/L — SIGNIFICANT CHANGE UP (ref 3.5–5)
POTASSIUM SERPL-SCNC: 3.6 MMOL/L — SIGNIFICANT CHANGE UP (ref 3.5–5)
PROT SERPL-MCNC: 7.2 G/DL — SIGNIFICANT CHANGE UP (ref 6–8)
RAPID RVP RESULT: DETECTED
RBC # BLD: 5.57 M/UL — SIGNIFICANT CHANGE UP (ref 4.7–6.1)
RBC # FLD: 12.7 % — SIGNIFICANT CHANGE UP (ref 11.5–14.5)
SAO2 % BLDV: 66 % — SIGNIFICANT CHANGE UP
SARS-COV-2 RNA SPEC QL NAA+PROBE: DETECTED
SODIUM SERPL-SCNC: 144 MMOL/L — SIGNIFICANT CHANGE UP (ref 135–146)
TROPONIN T SERPL-MCNC: <0.01 NG/ML — SIGNIFICANT CHANGE UP
WBC # BLD: 9.34 K/UL — SIGNIFICANT CHANGE UP (ref 4.8–10.8)
WBC # FLD AUTO: 9.34 K/UL — SIGNIFICANT CHANGE UP (ref 4.8–10.8)

## 2021-02-07 PROCEDURE — 74019 RADEX ABDOMEN 2 VIEWS: CPT | Mod: 26

## 2021-02-07 PROCEDURE — 76705 ECHO EXAM OF ABDOMEN: CPT | Mod: 26

## 2021-02-07 PROCEDURE — 74177 CT ABD & PELVIS W/CONTRAST: CPT | Mod: 26

## 2021-02-07 PROCEDURE — 99223 1ST HOSP IP/OBS HIGH 75: CPT

## 2021-02-07 PROCEDURE — 99285 EMERGENCY DEPT VISIT HI MDM: CPT

## 2021-02-07 PROCEDURE — 71045 X-RAY EXAM CHEST 1 VIEW: CPT | Mod: 26

## 2021-02-07 RX ORDER — CHOLECALCIFEROL (VITAMIN D3) 125 MCG
1000 CAPSULE ORAL ONCE
Refills: 0 | Status: DISCONTINUED | OUTPATIENT
Start: 2021-02-07 | End: 2021-02-09

## 2021-02-07 RX ORDER — LOSARTAN POTASSIUM 100 MG/1
100 TABLET, FILM COATED ORAL DAILY
Refills: 0 | Status: DISCONTINUED | OUTPATIENT
Start: 2021-02-07 | End: 2021-02-07

## 2021-02-07 RX ORDER — ASPIRIN/CALCIUM CARB/MAGNESIUM 324 MG
1 TABLET ORAL
Qty: 0 | Refills: 0 | DISCHARGE

## 2021-02-07 RX ORDER — AMLODIPINE BESYLATE 2.5 MG/1
5 TABLET ORAL ONCE
Refills: 0 | Status: COMPLETED | OUTPATIENT
Start: 2021-02-07 | End: 2021-02-07

## 2021-02-07 RX ORDER — METFORMIN HYDROCHLORIDE 850 MG/1
1 TABLET ORAL
Qty: 0 | Refills: 0 | DISCHARGE

## 2021-02-07 RX ORDER — ASPIRIN/CALCIUM CARB/MAGNESIUM 324 MG
81 TABLET ORAL DAILY
Refills: 0 | Status: DISCONTINUED | OUTPATIENT
Start: 2021-02-07 | End: 2021-02-09

## 2021-02-07 RX ORDER — CHLORHEXIDINE GLUCONATE 213 G/1000ML
1 SOLUTION TOPICAL
Refills: 0 | Status: DISCONTINUED | OUTPATIENT
Start: 2021-02-07 | End: 2021-02-09

## 2021-02-07 RX ORDER — ATORVASTATIN CALCIUM 80 MG/1
10 TABLET, FILM COATED ORAL AT BEDTIME
Refills: 0 | Status: DISCONTINUED | OUTPATIENT
Start: 2021-02-07 | End: 2021-02-09

## 2021-02-07 RX ORDER — ONDANSETRON 8 MG/1
8 TABLET, FILM COATED ORAL ONCE
Refills: 0 | Status: COMPLETED | OUTPATIENT
Start: 2021-02-07 | End: 2021-02-07

## 2021-02-07 RX ORDER — AMLODIPINE BESYLATE 2.5 MG/1
1 TABLET ORAL
Qty: 0 | Refills: 0 | DISCHARGE

## 2021-02-07 RX ORDER — METFORMIN HYDROCHLORIDE 850 MG/1
500 TABLET ORAL ONCE
Refills: 0 | Status: COMPLETED | OUTPATIENT
Start: 2021-02-07 | End: 2021-02-07

## 2021-02-07 RX ORDER — CHOLECALCIFEROL (VITAMIN D3) 125 MCG
1 CAPSULE ORAL
Qty: 0 | Refills: 0 | DISCHARGE

## 2021-02-07 RX ORDER — SODIUM CHLORIDE 9 MG/ML
2000 INJECTION, SOLUTION INTRAVENOUS ONCE
Refills: 0 | Status: COMPLETED | OUTPATIENT
Start: 2021-02-07 | End: 2021-02-07

## 2021-02-07 RX ORDER — LOSARTAN POTASSIUM 100 MG/1
1 TABLET, FILM COATED ORAL
Qty: 0 | Refills: 0 | DISCHARGE

## 2021-02-07 RX ORDER — HYDROCHLOROTHIAZIDE 25 MG
25 TABLET ORAL ONCE
Refills: 0 | Status: DISCONTINUED | OUTPATIENT
Start: 2021-02-07 | End: 2021-02-07

## 2021-02-07 RX ORDER — FAMOTIDINE 10 MG/ML
20 INJECTION INTRAVENOUS ONCE
Refills: 0 | Status: COMPLETED | OUTPATIENT
Start: 2021-02-07 | End: 2021-02-07

## 2021-02-07 RX ORDER — PREGABALIN 225 MG/1
1 CAPSULE ORAL
Qty: 0 | Refills: 0 | DISCHARGE

## 2021-02-07 RX ORDER — HYDROMORPHONE HYDROCHLORIDE 2 MG/ML
1 INJECTION INTRAMUSCULAR; INTRAVENOUS; SUBCUTANEOUS ONCE
Refills: 0 | Status: DISCONTINUED | OUTPATIENT
Start: 2021-02-07 | End: 2021-02-07

## 2021-02-07 RX ORDER — IOHEXOL 300 MG/ML
30 INJECTION, SOLUTION INTRAVENOUS ONCE
Refills: 0 | Status: COMPLETED | OUTPATIENT
Start: 2021-02-07 | End: 2021-02-07

## 2021-02-07 RX ORDER — ASPIRIN/CALCIUM CARB/MAGNESIUM 324 MG
0 TABLET ORAL
Qty: 0 | Refills: 0 | DISCHARGE

## 2021-02-07 RX ORDER — CHOLECALCIFEROL (VITAMIN D3) 125 MCG
0 CAPSULE ORAL
Qty: 0 | Refills: 0 | DISCHARGE

## 2021-02-07 RX ORDER — HEPARIN SODIUM 5000 [USP'U]/ML
5000 INJECTION INTRAVENOUS; SUBCUTANEOUS EVERY 8 HOURS
Refills: 0 | Status: DISCONTINUED | OUTPATIENT
Start: 2021-02-07 | End: 2021-02-09

## 2021-02-07 RX ORDER — MORPHINE SULFATE 50 MG/1
8 CAPSULE, EXTENDED RELEASE ORAL ONCE
Refills: 0 | Status: DISCONTINUED | OUTPATIENT
Start: 2021-02-07 | End: 2021-02-07

## 2021-02-07 RX ORDER — SODIUM CHLORIDE 9 MG/ML
1000 INJECTION, SOLUTION INTRAVENOUS
Refills: 0 | Status: DISCONTINUED | OUTPATIENT
Start: 2021-02-07 | End: 2021-02-08

## 2021-02-07 RX ADMIN — AMLODIPINE BESYLATE 5 MILLIGRAM(S): 2.5 TABLET ORAL at 16:36

## 2021-02-07 RX ADMIN — MORPHINE SULFATE 8 MILLIGRAM(S): 50 CAPSULE, EXTENDED RELEASE ORAL at 09:32

## 2021-02-07 RX ADMIN — HEPARIN SODIUM 5000 UNIT(S): 5000 INJECTION INTRAVENOUS; SUBCUTANEOUS at 21:15

## 2021-02-07 RX ADMIN — IOHEXOL 30 MILLILITER(S): 300 INJECTION, SOLUTION INTRAVENOUS at 09:32

## 2021-02-07 RX ADMIN — SODIUM CHLORIDE 2000 MILLILITER(S): 9 INJECTION, SOLUTION INTRAVENOUS at 09:37

## 2021-02-07 RX ADMIN — FAMOTIDINE 100 MILLIGRAM(S): 10 INJECTION INTRAVENOUS at 09:38

## 2021-02-07 RX ADMIN — SODIUM CHLORIDE 75 MILLILITER(S): 9 INJECTION, SOLUTION INTRAVENOUS at 16:41

## 2021-02-07 RX ADMIN — HYDROMORPHONE HYDROCHLORIDE 1 MILLIGRAM(S): 2 INJECTION INTRAMUSCULAR; INTRAVENOUS; SUBCUTANEOUS at 10:30

## 2021-02-07 RX ADMIN — ONDANSETRON 8 MILLIGRAM(S): 8 TABLET, FILM COATED ORAL at 09:31

## 2021-02-07 NOTE — ED PROVIDER NOTE - OBJECTIVE STATEMENT
64 yo M hx of colon ca with lung mets, HTN, DM c/o epgiastric pain  since 5am. Pain is constant,  "8" in severity, and described as tightness. No modifying factors. + n/v. No SOB. COVID +

## 2021-02-07 NOTE — ED PROVIDER NOTE - ATTENDING CONTRIBUTION TO CARE
65 year old male, pmhx as documented, presenting with epigastric abdominal pain described as tightness, constant, non-radiating, no palliative or provocative factors, moderate severity, associated with nausea and several episodes of NBNB vomiting since this AM. States he has been unable to tolerate PO since symptoms onset. Otherwise denies fevers, chest pain, dyspnea, diarrhea, blood in stool, urinary symptoms or leg swelling. Has been passing flatus.    Vital Signs: I have reviewed the initial vital signs.  Constitutional: NAD, well-nourished, appears stated age, no acute distress.  HEENT: Airway patent, moist MM, no erythema/swelling/deformity of oral structures. EOMI, PERRLA.  CV: regular rate, regular rhythm, well-perfused extremities, 2+ b/l DP and radial pulses equal.  Lungs: BCTA, no increased WOB.  ABD: (+) diffuse tenderness, no guarding or rebound, no pulsatile mass, no hernias.   MSK: Neck supple, nontender, nl ROM, no stepoff. Chest nontender. Back nontender in TLS spine or to b/l bony structures or flanks. Ext nontender, nl rom, no deformity.   INTEG: Skin warm, dry, no rash.  NEURO: A&Ox3, normal strength, nl sensation throughout, normal speech.   PSYCH: Calm, cooperative, normal affect and interaction.    Will obtain labs, CT abd/pelvis, IVF, symptomatic control, re-eval.

## 2021-02-07 NOTE — ED PROVIDER NOTE - PHYSICAL EXAMINATION
Gen: Alert, NAD, well appearing  Head: NC, AT, PERRL, EOMI, normal lids/conjunctiva  ENT: normal hearing  Neck: +supple, no tenderness/meningismus,  Pulm: Bilateral BS, normal resp effort, no wheeze/stridor/retractions  CV: RRR  Abd: soft, +tender to palpation over epigastrium. No guarding or rebound  Mskel: no edema/erythema/cyanosis  Skin: no rash, warm/dry  Neuro: AAOx3, no sensory/motor deficits Gen: Alert, NAD, well appearing  Head: NC, AT, PERRL, EOMI, normal lids/conjunctiva  ENT: normal hearing  Neck: +supple, no tenderness/meningismus,  Pulm: Bilateral BS, normal resp effort, no wheeze/stridor/retractions  CV: RRR  Abd: soft, +distension upper abdomen. +tender to palpation over epigastrium. No guarding or rebound  Mskel: no edema/erythema/cyanosis  Skin: no rash, warm/dry  Neuro: AAOx3, no sensory/motor deficits

## 2021-02-07 NOTE — H&P ADULT - NSHPPHYSICALEXAM_GEN_ALL_CORE
Vital Signs Last 24 Hrs    T(F): 96.3 (02-07-21 @ 09:08), Max: 96.3 (02-07-21 @ 09:08)  HR: 92 (02-07-21 @ 12:30) (69 - 92)  BP: 169/95 (02-07-21 @ 12:30)  RR: 18 (02-07-21 @ 12:30) (18 - 20)  SpO2: 96% (02-07-21 @ 12:30) (96% - 96%)

## 2021-02-07 NOTE — ED PROVIDER NOTE - NS ED ROS FT
Review of Systems    Constitutional: (-) fever, (+) chills  Eyes/ENT: (-) blurry vision, (-) epistaxis, (-) sore throat  Cardiovascular: (-) chest pain, (-) syncope  Respiratory: (-) cough, (-) shortness of breath  Gastrointestinal: (+) pain, (+) nausea, (+) vomiting, (-) diarrhea  Musculoskeletal: (-) neck pain, (-) back pain, (-) body aches  Integumentary: (-) rash, (-) edema  Neurological: (-) headache, (-) altered mental status  Psychiatric: (-) hallucinations  Allergic/Immunologic: (-) pruritus

## 2021-02-07 NOTE — H&P ADULT - NSICDXPASTMEDICALHX_GEN_ALL_CORE_FT
PAST MEDICAL HISTORY:  Colon cancer 2011: resection    COVID-19 2/2021    Essential hypertension     HLD (hyperlipidemia)     Lung cancer 2013: right lung: Chemo+Lobectomy    Type 2 diabetes mellitus

## 2021-02-07 NOTE — H&P ADULT - ASSESSMENT
66 y/o male recent Covid +: 7 days ago, c/o lower sternal pressure and ground glass opacity on CT scan noted.

## 2021-02-07 NOTE — H&P ADULT - HISTORY OF PRESENT ILLNESS
66 y/o male recently diagnosed with Covid + 7 days ago. He presents to ER c/o pressure in area of Left lower sternum that started 5AM today. Patient thought this was a gas pain. Attempted to walk around to stimulate bowel movement, gave himself a enema; no relief.  **denies abdominal pain.  -similar episode 3 days ago that resolved on it's own.  -No fever, +Chills, +Sweats, No pleuritic Chest pain on deep inspiration.  -Covid+ 7 days ago: Symptoms: Dyspnea, Fatigue.  Has been home self quarantined since then.

## 2021-02-07 NOTE — ED ADULT NURSE NOTE - TEMPLATE LIST FOR HEAD TO TOE ASSESSMENT
General Bilobed Transposition Flap Text: The defect edges were debeveled with a #15 scalpel blade.  Given the location of the defect and the proximity to free margins a bilobed transposition flap was deemed most appropriate.  Using a sterile surgical marker, an appropriate bilobe flap drawn around the defect.    The area thus outlined was incised deep to adipose tissue with a #15 scalpel blade.  The skin margins were undermined to an appropriate distance in all directions utilizing iris scissors.

## 2021-02-07 NOTE — ED ADULT NURSE NOTE - NSIMPLEMENTINTERV_GEN_ALL_ED
Implemented All Universal Safety Interventions:  [place] to call system. Call bell, personal items and telephone within reach. Instruct patient to call for assistance. Room bathroom lighting operational. Non-slip footwear when patient is off stretcher. Physically safe environment: no spills, clutter or unnecessary equipment. Stretcher in lowest position, wheels locked, appropriate side rails in place.

## 2021-02-07 NOTE — H&P ADULT - PROBLEM SELECTOR PLAN 5
Unsure if acute or chronic, hold HCTZ, Unsure if acute or chronic, hold HCTZ/Losartan, IV hydration, repeat Bun/VCreat in AM

## 2021-02-07 NOTE — CONSULT NOTE ADULT - ASSESSMENT
64yo M presenting to ED with c/o abd pain, found to be covid +, Ct scan showing b/l viral pneumonia, Trace nonspecific pericholecystic haziness. Surgery called to evaluated pt for abd pain, r/o cholecystitis.     66yo M presenting to ED with c/o abd pain, found to be covid +, Ct scan showing b/l viral pneumonia, Trace nonspecific pericholecystic haziness. Surgery called to evaluated pt for abd pain, r/o cholecystitis. Pain improved after IVF, IV pepcid, and dilaudid.

## 2021-02-07 NOTE — CONSULT NOTE ADULT - ATTENDING COMMENTS
above noted discussed case with surgical PA abdomen soft no distension ct scan and sonogram and labs noted

## 2021-02-07 NOTE — CONSULT NOTE ADULT - PROBLEM SELECTOR RECOMMENDATION 9
rurahat sono  NPO  IVF  serial exam and lab-lactate, cbc, cmp  will d/w attending f/u official ruq [id number] fluid, no stones of gbw thickening  NPO  IVF  serial exam and lab-lactate, cbc, cmp  d/w Dr larkin if continues to have ruq pain then would recommend to get a HIDA scan

## 2021-02-07 NOTE — H&P ADULT - PROBLEM SELECTOR PLAN 1
Pressure sensation left lower sternal area due to recent Covid PNA that is seen on CT Scan today, saturating well, will transfer North, DVT prophylaxis Pressure sensation left lower sternal area likely due to recent Covid PNA that is seen on CT Scan today, Saturating well, will transfer North, DVT prophylaxis

## 2021-02-07 NOTE — ED PROVIDER NOTE - CARE PLAN
Principal Discharge DX:	Abdominal pain  Secondary Diagnosis:	Vomiting  Secondary Diagnosis:	Biliary colic

## 2021-02-07 NOTE — H&P ADULT - ATTENDING COMMENTS
#Chest pain, L sided, nonradiating, unchanged with exertion  reproducible with light palpation; in setting of covid  ce neg, trend  ekg unremarkable  low suspicion acs  check tte  #Covid infection  c/b lactic acidosis  half ns 75 cc/hr  trend lactate  no hypoxia, satting well on ra  check crp, d-dimer, ferritin, fibrinogen  check covid ab  monitor off steroids  #Pericholecystic fluid, incidentally noted on ct abd  us nondiagnostic  no clinical evidence of acute orny  check hida  f/u sx

## 2021-02-07 NOTE — ED PROVIDER NOTE - CLINICAL SUMMARY MEDICAL DECISION MAKING FREE TEXT BOX
Patient presented with worsening abdominal pain, N/V since this AM. (+) tender on exam but otherwise HD stable, afebrile. Obtained labs which showed (+) mild JAMAAL likely 2/2 dehydration with mildly elevated lipase but normal LFTs, no leukocytosis. CT abd/pelvis showed (+) questionable fluid around gallbladder as well as COVID-like findings in the base of the lungs (patient wound up being COVID+). RUQ US therefore obtained which showed biliary colic, negative sonographic murphys but could still be acute cholecystitis. Consulted surgery and they recommended admission to medicine with them following, likely for HIDA scan. Patient felt much better after tx in ED and serial exams benign. Will admit for further management. Patient agreeable with plan. HD stable at time of admission.

## 2021-02-07 NOTE — H&P ADULT - PROBLEM SELECTOR PLAN 6
Seen by Surgical team:  if patient developes abdominal pain a Hida scan is recommended for evaluation

## 2021-02-07 NOTE — H&P ADULT - PROBLEM SELECTOR PLAN 2
continue Bp meds: hold HCTZ due to elevate Bun/Creat, DASH diet continue BP med: hold HCTZ due to elevate Bun/Creat, DASH diet, follow Bun/Creat on Losartan continue BP med: hold HCTZ/Losartan due to elevated Bun/Creat, DASH diet, follow Bun/Creat trend with hydration continue BP med Norvasc: hold HCTZ/Losartan due to elevated Bun/Creat, DASH diet, follow Bun/Creat trend with hydration

## 2021-02-07 NOTE — H&P ADULT - NSHPLABSRESULTS_GEN_ALL_CORE
16.0   9.34  )-----------( 294      ( 07 Feb 2021 09:17 )             47.7       02-07    144  |  102  |  25<H>  ----------------------------<  192<H>  3.6   |  27  |  1.8<H>    Ca    11.3<H>      07 Feb 2021 09:17  Mg     1.8     02-07    TPro  7.2  /  Alb  4.7  /  TBili  0.5  /  DBili  x   /  AST  30  /  ALT  39  /  AlkPhos  82  02-07        Lactate Trend  02-07 @ 10:07 Lactate:3.4       CARDIAC MARKERS ( 07 Feb 2021 09:17 )  x     / <0.01 ng/mL       POCT Blood Glucose.: 189 mg/dL (07 Feb 2021 09:16)    CT Abdomen and Pelvis w/ Oral Cont and w/ IV Cont (02.07.21 @ 11:53) >    IMPRESSION:    Trace nonspecific pericholecystic haziness. Right upper quadrant ultrasound may be obtained for further evaluation.    Bilateral lower lung field peripheral and patchy faint groundglass opacities, nonspecific in nature, but can be seen with  atypical/viral pneumonia in appropriate clinical setting.    Distal esophageal wall thickening. Outpatient direct visualization is recommended    US Abdomen Limited (02.07.21 @ 14:13) >    IMPRESSION:    Layering gallbladder sludge withmild gallbladder wall thickening up to 4 mm and trace pericholecystic fluid. Although the sonographic North's sign is negative, findings are equivocal for acute cholecystitis. If clinically indicated, nuclear medicine HIDA scan can be obtained for further evaluation.    Hepatic steatosis.    Right upper pole renal cyst measuring 3.3 cm.     Xray Abdomen 2 Views (02.07.21 @ 10:12) >      Impression:    Gaseous distention of the stomach. Nonobstructive bowel gas pattern.

## 2021-02-08 LAB
ALBUMIN SERPL ELPH-MCNC: 3.5 G/DL — SIGNIFICANT CHANGE UP (ref 3.5–5.2)
ALP SERPL-CCNC: 55 U/L — SIGNIFICANT CHANGE UP (ref 30–115)
ALT FLD-CCNC: 35 U/L — SIGNIFICANT CHANGE UP (ref 0–41)
ANION GAP SERPL CALC-SCNC: 14 MMOL/L — SIGNIFICANT CHANGE UP (ref 7–14)
APTT BLD: 31 SEC — SIGNIFICANT CHANGE UP (ref 27–39.2)
AST SERPL-CCNC: 23 U/L — SIGNIFICANT CHANGE UP (ref 0–41)
BASOPHILS # BLD AUTO: 0.05 K/UL — SIGNIFICANT CHANGE UP (ref 0–0.2)
BASOPHILS NFR BLD AUTO: 0.6 % — SIGNIFICANT CHANGE UP (ref 0–1)
BILIRUB SERPL-MCNC: 0.7 MG/DL — SIGNIFICANT CHANGE UP (ref 0.2–1.2)
BLD GP AB SCN SERPL QL: SIGNIFICANT CHANGE UP
BUN SERPL-MCNC: 18 MG/DL — SIGNIFICANT CHANGE UP (ref 10–20)
CALCIUM SERPL-MCNC: 9.2 MG/DL — SIGNIFICANT CHANGE UP (ref 8.5–10.1)
CHLORIDE SERPL-SCNC: 102 MMOL/L — SIGNIFICANT CHANGE UP (ref 98–110)
CO2 SERPL-SCNC: 23 MMOL/L — SIGNIFICANT CHANGE UP (ref 17–32)
CREAT SERPL-MCNC: 1.2 MG/DL — SIGNIFICANT CHANGE UP (ref 0.7–1.5)
EOSINOPHIL # BLD AUTO: 0.07 K/UL — SIGNIFICANT CHANGE UP (ref 0–0.7)
EOSINOPHIL NFR BLD AUTO: 0.9 % — SIGNIFICANT CHANGE UP (ref 0–8)
GLUCOSE SERPL-MCNC: 121 MG/DL — HIGH (ref 70–99)
HCT VFR BLD CALC: 41.4 % — LOW (ref 42–52)
HGB BLD-MCNC: 14.1 G/DL — SIGNIFICANT CHANGE UP (ref 14–18)
IMM GRANULOCYTES NFR BLD AUTO: 0.5 % — HIGH (ref 0.1–0.3)
INR BLD: 1.08 RATIO — SIGNIFICANT CHANGE UP (ref 0.65–1.3)
INR BLD: 1.08 RATIO — SIGNIFICANT CHANGE UP (ref 0.65–1.3)
LACTATE SERPL-SCNC: 1 MMOL/L — SIGNIFICANT CHANGE UP (ref 0.7–2)
LYMPHOCYTES # BLD AUTO: 1.38 K/UL — SIGNIFICANT CHANGE UP (ref 1.2–3.4)
LYMPHOCYTES # BLD AUTO: 17.7 % — LOW (ref 20.5–51.1)
MAGNESIUM SERPL-MCNC: 1.6 MG/DL — LOW (ref 1.8–2.4)
MCHC RBC-ENTMCNC: 28.9 PG — SIGNIFICANT CHANGE UP (ref 27–31)
MCHC RBC-ENTMCNC: 34.1 G/DL — SIGNIFICANT CHANGE UP (ref 32–37)
MCV RBC AUTO: 84.8 FL — SIGNIFICANT CHANGE UP (ref 80–94)
MONOCYTES # BLD AUTO: 0.64 K/UL — HIGH (ref 0.1–0.6)
MONOCYTES NFR BLD AUTO: 8.2 % — SIGNIFICANT CHANGE UP (ref 1.7–9.3)
NEUTROPHILS # BLD AUTO: 5.61 K/UL — SIGNIFICANT CHANGE UP (ref 1.4–6.5)
NEUTROPHILS NFR BLD AUTO: 72.1 % — SIGNIFICANT CHANGE UP (ref 42.2–75.2)
NRBC # BLD: 0 /100 WBCS — SIGNIFICANT CHANGE UP (ref 0–0)
PHOSPHATE SERPL-MCNC: 3.5 MG/DL — SIGNIFICANT CHANGE UP (ref 2.1–4.9)
PLATELET # BLD AUTO: 265 K/UL — SIGNIFICANT CHANGE UP (ref 130–400)
POTASSIUM SERPL-MCNC: 4 MMOL/L — SIGNIFICANT CHANGE UP (ref 3.5–5)
POTASSIUM SERPL-SCNC: 4 MMOL/L — SIGNIFICANT CHANGE UP (ref 3.5–5)
PROT SERPL-MCNC: 5.5 G/DL — LOW (ref 6–8)
PROTHROM AB SERPL-ACNC: 12.4 SEC — SIGNIFICANT CHANGE UP (ref 9.95–12.87)
PROTHROM AB SERPL-ACNC: 12.4 SEC — SIGNIFICANT CHANGE UP (ref 9.95–12.87)
RBC # BLD: 4.88 M/UL — SIGNIFICANT CHANGE UP (ref 4.7–6.1)
RBC # FLD: 12.8 % — SIGNIFICANT CHANGE UP (ref 11.5–14.5)
SODIUM SERPL-SCNC: 139 MMOL/L — SIGNIFICANT CHANGE UP (ref 135–146)
WBC # BLD: 7.79 K/UL — SIGNIFICANT CHANGE UP (ref 4.8–10.8)
WBC # FLD AUTO: 7.79 K/UL — SIGNIFICANT CHANGE UP (ref 4.8–10.8)

## 2021-02-08 PROCEDURE — 99253 IP/OBS CNSLTJ NEW/EST LOW 45: CPT

## 2021-02-08 PROCEDURE — 78226 HEPATOBILIARY SYSTEM IMAGING: CPT | Mod: 26

## 2021-02-08 PROCEDURE — 99233 SBSQ HOSP IP/OBS HIGH 50: CPT

## 2021-02-08 RX ORDER — METRONIDAZOLE 500 MG
500 TABLET ORAL EVERY 8 HOURS
Refills: 0 | Status: DISCONTINUED | OUTPATIENT
Start: 2021-02-08 | End: 2021-02-09

## 2021-02-08 RX ORDER — METRONIDAZOLE 500 MG
TABLET ORAL
Refills: 0 | Status: DISCONTINUED | OUTPATIENT
Start: 2021-02-08 | End: 2021-02-09

## 2021-02-08 RX ORDER — METRONIDAZOLE 500 MG
500 TABLET ORAL ONCE
Refills: 0 | Status: COMPLETED | OUTPATIENT
Start: 2021-02-08 | End: 2021-02-08

## 2021-02-08 RX ORDER — SODIUM CHLORIDE 9 MG/ML
1000 INJECTION INTRAMUSCULAR; INTRAVENOUS; SUBCUTANEOUS
Refills: 0 | Status: DISCONTINUED | OUTPATIENT
Start: 2021-02-08 | End: 2021-02-09

## 2021-02-08 RX ORDER — ACETAMINOPHEN 500 MG
650 TABLET ORAL EVERY 6 HOURS
Refills: 0 | Status: DISCONTINUED | OUTPATIENT
Start: 2021-02-08 | End: 2021-02-09

## 2021-02-08 RX ORDER — CIPROFLOXACIN LACTATE 400MG/40ML
400 VIAL (ML) INTRAVENOUS EVERY 12 HOURS
Refills: 0 | Status: DISCONTINUED | OUTPATIENT
Start: 2021-02-08 | End: 2021-02-09

## 2021-02-08 RX ADMIN — ATORVASTATIN CALCIUM 10 MILLIGRAM(S): 80 TABLET, FILM COATED ORAL at 21:07

## 2021-02-08 RX ADMIN — SODIUM CHLORIDE 75 MILLILITER(S): 9 INJECTION, SOLUTION INTRAVENOUS at 04:52

## 2021-02-08 RX ADMIN — HEPARIN SODIUM 5000 UNIT(S): 5000 INJECTION INTRAVENOUS; SUBCUTANEOUS at 04:52

## 2021-02-08 RX ADMIN — SODIUM CHLORIDE 75 MILLILITER(S): 9 INJECTION INTRAMUSCULAR; INTRAVENOUS; SUBCUTANEOUS at 20:20

## 2021-02-08 RX ADMIN — Medication 100 MILLIGRAM(S): at 21:06

## 2021-02-08 RX ADMIN — HEPARIN SODIUM 5000 UNIT(S): 5000 INJECTION INTRAVENOUS; SUBCUTANEOUS at 21:07

## 2021-02-08 RX ADMIN — Medication 100 MILLIGRAM(S): at 11:24

## 2021-02-08 RX ADMIN — Medication 650 MILLIGRAM(S): at 10:02

## 2021-02-08 RX ADMIN — SODIUM CHLORIDE 75 MILLILITER(S): 9 INJECTION INTRAMUSCULAR; INTRAVENOUS; SUBCUTANEOUS at 11:24

## 2021-02-08 RX ADMIN — Medication 200 MILLIGRAM(S): at 17:20

## 2021-02-08 RX ADMIN — Medication 81 MILLIGRAM(S): at 10:03

## 2021-02-08 RX ADMIN — HEPARIN SODIUM 5000 UNIT(S): 5000 INJECTION INTRAVENOUS; SUBCUTANEOUS at 12:15

## 2021-02-08 NOTE — CONSULT NOTE ADULT - ASSESSMENT
Impression/ PLan    Incomplete until reviewed by Attending    #RUQ abdominal pain with US showing Layered sludge   -VItals stable, afebrile, not septic appearing,  -Lactic acidosis- 3.4>3.3,   -no increase in Alk phos, AST/ ALT, can send GGT  -Agree with HIDA  -Surgery Following    #Esophageal thickening in Hx of colon and lung Ca  -needs EGD  -hx of colon Ca 7 years ago s/p resection, Hx of lung Ca 5 years ago s/p chemo and RLL lobectomy  -Last colonoscopy 2 years ago has one scheduled (Dr. Smith) in March    #Hepatic Steatosis  -Diet and Weight loss    #Possible JAMAAL- Prerenal from poor oral intake? need baseline   -has NS running @75    #Covid Pna treatment per Medical team GI Histroy - Pt. reports feeeling generalized weak secondary to Covid but otherwise in good health when developed constant epigastric pressure 2 days ago associated with nasuea and 2 episodes of non-bloody vomiting, This pain resolved on admission However Pt. now reports RUQ to palpation starting this am, constant, dull and moderate without radiation. Pt. reports no appetite, no alleviating or aggravating factors also has been having headaches. Pt. denies any diarrhea/ constipation, no weight loss, no new meds.  Imaging -US showed layering Gallbladder sludge with mild gallbladder wall thickening up to 4mm and trace pericholecystic fluids, with negative North's sign.  CT showed trace nonspecific pericholecystic haziness, and Distal esophageal wall thickening  Relevant GI PMH Pt. follows with Dr. Smith, 7 years ago he was diagnosed with colon Ca s/p resection with recurrence to lung s/p chemo and right lower lobe lobectomy, Records are being faxed.  Previous colonoscopy done 2 years ago was reported to be clean, Pt. is scheduled for next Colonoscopy in March, reports never having an EGD?  Lab work unremarkable aside from elevated lactate which is downtrending afebrile, vitals stable.    Incomplete until reviewed by Attending    #RUQ abdominal pain with US showing Layered sludge, wall thickening to 4mm and trace pericholecystic fluid  -VItals stable, afebrile, not septic appearing, WBC 9 although can be decreased given Covid  -Lactic acidosis- 3.4>3.3,   -no increase in Alk phos, AST/ ALT, can send GGT  -Agree with HIDA  -start ABx  -Surgery Following     #Esophageal thickening in Hx of colon and lung Ca  -needs EGD  -hx of colon Ca 7 years ago s/p resection, Hx of lung Ca 5 years ago s/p chemo and RLL lobectomy  -Last colonoscopy 2 years ago has one scheduled (Dr. Smith) in March    #Hepatic Steatosis  -Diet and Weight loss    #Possible JAMAAL- Prerenal from poor oral intake? need baseline   -has NS running @74    #Covid Pna treatment per Medical team

## 2021-02-08 NOTE — CONSULT NOTE ADULT - SUBJECTIVE AND OBJECTIVE BOX
HPI:  66yo M with a pmhx as below presenting to ED with c/o abd pain which began at 5am today, described as constant, rated 8/10, no aggravating or alleviating factors. Patient denies associated fevers, chills, nausea, vomiting, hematochezia.    PAST MEDICAL & SURGICAL HISTORY:  Lung cancer    Colon cancer    Essential hypertension    History of colon resection        Allergies    No Known Allergies        MEDICATIONS  at home:  janumet, losaratn, pravastatin, amlodipine, asa    ROS:  General:	no fever, weight loss,  chills  Skin: no rash, ulcers  Ophthalmologic: no visual changes  ENMT:	no sore throat  Respiratory and Thorax: no cough, wheeze,  sob  Cardiovascular:	no chest pain, palpitations, dizziness  Gastrointestinal:	no nausea, vomiting, diarrhea, + abd pain  Genitourinary:	no dysuria, hematuria  Musculoskeletal:	no joint pains  Neurological:	 no speech disturbance, focal weakness, numbness  Psychiatric:	no depression, anxiety, psychosis  Hematology/Lymphatics:	no anemia  Endocrine:	no polyuria, polydipsia        Vital Signs Last 24 Hrs  T(F): 96.3 (07 Feb 2021 09:08), Max: 96.3 (07 Feb 2021 09:08)  HR: 71 (07 Feb 2021 09:55) (69 - 71)  BP: 166/91 (07 Feb 2021 09:55) (166/91 - 182/94)  RR: 19 (07 Feb 2021 09:55) (19 - 20)  SpO2: 96% (07 Feb 2021 09:55) (96% - 96%)    PHYSICAL EXAM:      Constitutional: A&Ox4  Respiratory: cta b/l  Cardiovascular: s1 s2 rrr  Gastrointestinal: soft nt  nd + bs no rebound or guarding  Genitourinary: no cva tenderness  Extremities: normal rom, no edema, calf tenderness  Neurological:no focal deficits  Skin: no rash                            16.0   9.34  )-----------( 294      ( 07 Feb 2021 09:17 )             47.7       02-07    144  |  102  |  25<H>  ----------------------------<  192<H>  3.6   |  27  |  1.8<H>    Ca    11.3<H>      07 Feb 2021 09:17  Mg     1.8     02-07    TPro  7.2  /  Alb  4.7  /  TBili  0.5  /  DBili  x   /  AST  30  /  ALT  39  /  AlkPhos  82  02-07      < from: CT Abdomen and Pelvis w/ Oral Cont and w/ IV Cont (02.07.21 @ 11:53) >    IMPRESSION:    Trace nonspecific pericholecystic haziness. Right upper quadrant ultrasound may be obtained for further evaluation.    Bibasilar peripheral and patchy faint groundglass opacities, compatible with atypical/viral pneumonia in appropriate clinical setting.    < from: Xray Abdomen 2 Views (02.07.21 @ 10:12) >  Impression:    Gaseous distention of the stomach. Nonobstructive bowel gas pattern.      < from: Xray Chest 1 View-PORTABLE IMMEDIATE (Xray Chest 1 View-PORTABLE IMMEDIATE .) (02.07.21 @ 09:35) >  Impression:    No consolidation, effusion or pneumothorax.        
HPI:   66 y/o male recently diagnosed with Covid + 7 days ago. He presents to ER c/o pressure in area of Left lower sternum that started 5AM today. Patient thought this was a gas pain. Attempted to walk around to stimulate bowel movement, gave himself a enema; no relief.  **denies abdominal pain.  -similar episode 3 days ago that resolved on it's own.  -No fever, +Chills, +Sweats, No pleuritic Chest pain on deep inspiration.  -Covid+ 7 days ago: Symptoms: Dyspnea, Fatigue.  Has been home self quarantined since then. (07 Feb 2021 15:16    GI Histroy - Pt. reports feeeling generalized weak secondary to Covid but otherwise in good health when developed constant epigastric pressure 2 days ago associated with nasuea and 2 episodes of non-bloody vomiting, This pain resolved on admission However Pt. now reports RUQ to palpation starting this am, constant, dull and moderate without radiation. Pt. reports no appetite, no alleviating or aggravating factors also has been having headaches. Pt. denies any diarrhea/ constipation, no weight loss, no new meds.  Imaging -US showed layering Gallbladder sludge with mild gallbladder wall thickening up to 4mm and trace pericholecystic fluids, with negative North's sign.  CT showed trace nonspecific pericholecystic haziness, and Distal esophageal wall thickening  Relevant GI PMH Pt. follows with Dr. Smith, 7 years ago he was diagnosed with colon Ca s/p resection with recurrence to lung s/p chemo and right lower lobe lobectomy, Records are being faxed.  Previous colonoscopy done 2 years ago was reported to be clean, Pt. is scheduled for next Colonoscopy in March, reports never having an EGD?  Lab work unremarkable aside from elevated lactate which is downtrending afebrile, vitals stable.        PAST MEDICAL & SURGICAL HISTORY:  COVID-19  2/2021    Type 2 diabetes mellitus    HLD (hyperlipidemia)    Lung cancer  2013: right lung: Chemo+Lobectomy    Colon cancer  2011: resection    Essential hypertension    S/P lobectomy of lung  Right lung    History of colon resection        REVIEW OF SYSTEMS- ALL negative aside from documented in HPI    MEDICATIONS  (STANDING):  aspirin enteric coated 81 milliGRAM(s) Oral daily  atorvastatin 10 milliGRAM(s) Oral at bedtime  chlorhexidine 4% Liquid 1 Application(s) Topical <User Schedule>  cholecalciferol 1000 Unit(s) Oral Once  heparin   Injectable 5000 Unit(s) SubCutaneous every 8 hours  sodium chloride 0.45%. 1000 milliLiter(s) (75 mL/Hr) IV Continuous <Continuous>    MEDICATIONS  (PRN):  acetaminophen   Tablet .. 650 milliGRAM(s) Oral every 6 hours PRN Mild Pain (1 - 3)      Allergies    No Known Allergies    Intolerances        SOCIAL HISTORY:    FAMILY HISTORY:  Family history of colon cancer in father        Vital Signs Last 24 Hrs  T(C): 37 (08 Feb 2021 05:38), Max: 37 (08 Feb 2021 05:38)  T(F): 98.6 (08 Feb 2021 05:38), Max: 98.6 (08 Feb 2021 05:38)  HR: 87 (08 Feb 2021 05:38) (75 - 92)  BP: 150/92 (08 Feb 2021 05:38) (147/80 - 169/95)  BP(mean): --  RR: 18 (08 Feb 2021 08:07) (18 - 19)  SpO2: 97% (08 Feb 2021 08:07) (95% - 99%)    PHYSICAL EXAM:  GENERAL: NAD, speaks in full sentences, no signs of respiratory distress  EYES: EOMI, PERRLA, non-icteric, no injected sclera  NECK: Supple, No JVD  CHEST/LUNG: Clear to auscultation bilaterally; No wheeze; No crackles; No accessory muscles used  HEART: Regular rate and rhythm; No murmurs;   ABDOMEN: MIld TTP RUQ, Soft, Nondistended; Bowel sounds present; No guarding  EXTREMITIES:  2+ Peripheral Pulses, No cyanosis or edema  PSYCH: AAOx3  NEUROLOGY: non-focal  SKIN: No rashes or lesions        LABS:                        16.0   9.34  )-----------( 294      ( 07 Feb 2021 09:17 )             47.7       Hemoglobin: 16.0 g/dL (02-07-21 @ 09:17)      02-07    144  |  102  |  25<H>  ----------------------------<  192<H>  3.6   |  27  |  1.8<H>    Ca    11.3<H>      07 Feb 2021 09:17  Mg     1.8     02-07    TPro  7.2  /  Alb  4.7  /  TBili  0.5  /  DBili  x   /  AST  30  /  ALT  39  /  AlkPhos  82  02-07          RADIOLOGY & ADDITIONAL STUDIES:    < from: US Abdomen Limited (02.07.21 @ 14:13) >  Liver: Increased echogenicity.  Bile ducts: Normal caliber. Common bile duct measures 5 mm.  Gallbladder: Gallbladder sludge. Trace pericholecystic fluid. Gallbladder wall thickness measures 4 mm. Negative sonographic North's sign.  Pancreas: Obscured by overlying bowel gas.  Right kidney: 10.7 cm. No hydronephrosis. Right upper pole renal cyst measuring 3.3 cm.  Ascites: None.  IVC: Visualized portions are within normal limits.    IMPRESSION:    Layering gallbladder sludge withmild gallbladder wall thickening up to 4 mm and trace pericholecystic fluid. Although the sonographic North's sign is negative, findings are equivocal for acute cholecystitis. If clinically indicated, nuclear medicine HIDA scan can be obtained for further evaluation.    Hepatic steatosis.    Right upper pole renal cyst measuring 3.3 cm.    < end of copied text >    < from: CT Abdomen and Pelvis w/ Oral Cont and w/ IV Cont (02.07.21 @ 11:53) >  LOWER CHEST: Bilateral lower lung field peripheral and patchy faint groundglass opacities. Bibasilar atelectasis. Right lower lobe scarring with suture material, likely postsurgical..    HEPATOBILIARY: Trace nonspecific pericholecystic haziness. Hepatic steatosis..    SPLEEN: Unremarkable..    PANCREAS: Unremarkable..    ADRENAL GLANDS: Unremarkable..    KIDNEYS: Symmetric renal enhancement. No hydronephrosis. Bilateral renal cysts and additional subcentimeter hypodensities too small to characterize..    ABDOMINOPELVIC NODES: Unremarkable..    PELVIC ORGANS: Unremarkable..    PERITONEUM/MESENTERY/BOWEL: Post partial resection of the colon. No evidence of bowel obstruction, ascites or pneumoperitoneum. Normal appendix. Distal esophageal wall thickening. Diverticulosis..    BONES/SOFT TISSUES: Small periumbilical ventral hernia containing short segment nonobstructed small bowel loop. Degenerative changes of the spine with grade 1 anterolisthesis L4 on L5.    OTHER: Vascular calcification..      IMPRESSION:    Trace nonspecific pericholecystic haziness. Right upper quadrant ultrasound may be obtained for further evaluation.    Bilateral lower lung field peripheral and patchy faint groundglass opacities, nonspecific in nature, but can be seen with  atypical/viral pneumonia in appropriate clinical setting.    Distal esophageal wall thickening. Outpatient direct visualization is recommended    < end of copied text >

## 2021-02-08 NOTE — PROGRESS NOTE ADULT - SUBJECTIVE AND OBJECTIVE BOX
Progress Note: Surgery  Patient: JOMAR CARROLL , 65y (1955)Male   MRN: [id number]  Location: [address]  Visit: 02-07-21 Inpatient  Date: 02-08-21 @ 08:38    Events over 24h: No acute event overnight. No new complaint. Pt is hemodynamically stable. Not complaining of abdominal pain at this time.    Vitals: T(F): 98.6 (02-08-21 @ 05:38), Max: 98.6 (02-08-21 @ 05:38)  HR: 87 (02-08-21 @ 05:38)  BP: 150/92 (02-08-21 @ 05:38) (147/80 - 182/94)  RR: 18 (02-08-21 @ 08:07)  SpO2: 97% (02-08-21 @ 08:07)    Diet: Diet, NPO (02-07-21 @ 20:57)    IV Fluid: cholecalciferol 1000 Unit(s) Oral Once  sodium chloride 0.45%. 1000 milliLiter(s) (75 mL/Hr) IV Continuous <Continuous>    Physical Examination:  General Appearance: NAD, alert and cooperative  Heart: S1 and S2. No murmurs. Rhythm is regular.  Lungs: Clear to auscultation BL without rales, rhonchi, wheezing, crackles or diminished breath sounds.  Abdomen: Soft, nondistended, nontender. No rigidity, guarding, or rebound tenderness.     Medications: [Standing]  aspirin enteric coated 81 milliGRAM(s) Oral daily  atorvastatin 10 milliGRAM(s) Oral at bedtime  chlorhexidine 4% Liquid 1 Application(s) Topical <User Schedule>  cholecalciferol 1000 Unit(s) Oral Once  heparin   Injectable 5000 Unit(s) SubCutaneous every 8 hours  sodium chloride 0.45%. 1000 milliLiter(s) (75 mL/Hr) IV Continuous <Continuous>    Medications:[PRN]  Labs:                        16.0   9.34  )-----------( 294      ( 07 Feb 2021 09:17 )             47.7   02-07    144  |  102  |  25<H>  ----------------------------<  192<H>  3.6   |  27  |  1.8<H>    Ca    11.3<H>      07 Feb 2021 09:17  Mg     1.8     02-07    TPro  7.2  /  Alb  4.7  /  TBili  0.5  /  DBili  x   /  AST  30  /  ALT  39  /  AlkPhos  82  02-07  LIVER FUNCTIONS - ( 07 Feb 2021 09:17 )  Alb: 4.7 g/dL / Pro: 7.2 g/dL / ALK PHOS: 82 U/L / ALT: 39 U/L / AST: 30 U/L / GGT: x         CARDIAC MARKERS ( 07 Feb 2021 09:17 )  x     / <0.01 ng/mL / x     / x     / x        Micro/Urine:    Imaging:  None/24h

## 2021-02-08 NOTE — CHART NOTE - NSCHARTNOTEFT_GEN_A_CORE
I made rounds today with the treatment team including the hospitalist, residents,  nurses and  and discussed the patient's current medical status and discharge  planning needs, and reviewed the chart.    T(C): 37 (02-08-21 @ 05:38), Max: 37 (02-08-21 @ 05:38)  HR: 87 (02-08-21 @ 05:38) (75 - 92)  BP: 150/92 (02-08-21 @ 05:38) (147/80 - 169/95)  RR: 18 (02-08-21 @ 08:07) (18 - 19)  SpO2: 97% (02-08-21 @ 08:07) (95% - 99%)          I reached out to the patient's health care proxy/ responsible family member-           [  x   ]  I reached  Arlen ( wife ) [phone]  and discussed the patient's medical condition,                   family concerns, and discharge planning           [     ]  I left a message with family               [     ]  I personally participated in rounds with the medical team and my resident and discussed the case. My resident reached                   family member/ HCP                                under my direction and supervision  and we reviewed the conversation          [     ]  My resident left a message with family under my direction and supervision                [     ]   My resident attended rounds and called the family     The following was discussed: vitals / respiratory status / surgery on case / possible HIDA scan to r/o cholecystitis           [     ] I spent 5-10 minutes on the above discussing medical issues with team members and family and/ or my resident    [     ] I spent 11-20 minutes on the above discussing medical issues with team members and family and/ or my resident    [  x   ] I spent 21-30 minutes on the above discussing medical issues with team members and family and/ or my resident

## 2021-02-08 NOTE — PROGRESS NOTE ADULT - SUBJECTIVE AND OBJECTIVE BOX
SUBJECTIVE:    Patient is a 65y old Male who presents with a chief complaint of Left lower sternal area pressure (08 Feb 2021 11:16)    Currently admitted to medicine with the primary diagnosis of Abdominal pain       Today is hospital day 1d. This morning he is resting comfortably in bed, reports having a mild headache.     PAST MEDICAL & SURGICAL HISTORY  COVID-19  2/2021    Type 2 diabetes mellitus    HLD (hyperlipidemia)    Lung cancer  2013: right lung: Chemo+Lobectomy    Colon cancer  2011: resection    Essential hypertension    S/P lobectomy of lung  Right lung    History of colon resection      SOCIAL HISTORY:  Negative for smoking/alcohol/drug use.     ALLERGIES:  No Known Allergies    MEDICATIONS:  STANDING MEDICATIONS  aspirin enteric coated 81 milliGRAM(s) Oral daily  atorvastatin 10 milliGRAM(s) Oral at bedtime  chlorhexidine 4% Liquid 1 Application(s) Topical <User Schedule>  cholecalciferol 1000 Unit(s) Oral Once  ciprofloxacin   IVPB 400 milliGRAM(s) IV Intermittent every 12 hours  heparin   Injectable 5000 Unit(s) SubCutaneous every 8 hours  metroNIDAZOLE  IVPB 500 milliGRAM(s) IV Intermittent every 8 hours  metroNIDAZOLE  IVPB      sodium chloride 0.9%. 1000 milliLiter(s) IV Continuous <Continuous>    PRN MEDICATIONS  acetaminophen   Tablet .. 650 milliGRAM(s) Oral every 6 hours PRN    VITALS:   T(F): 98.6  HR: 87  BP: 150/92  RR: 18  SpO2: 97%    PHYSICAL EXAM:  GEN: NAD, resting in bed  Chest: Decreased breath sounds, normal S1 and S2  Abdomen: soft, normoactive bowel sounds, tender to deep palpation right upper quadrant, negative North's   Extremities: No edema  Neuro: non focal       LABS:                        16.0   9.34  )-----------( 294      ( 07 Feb 2021 09:17 )             47.7     02-07    144  |  102  |  25<H>  ----------------------------<  192<H>  3.6   |  27  |  1.8<H>    Ca    11.3<H>      07 Feb 2021 09:17  Mg     1.8     02-07    TPro  7.2  /  Alb  4.7  /  TBili  0.5  /  DBili  x   /  AST  30  /  ALT  39  /  AlkPhos  82  02-07    CARDIAC MARKERS ( 07 Feb 2021 09:17 )  x     / <0.01 ng/mL / x     / x     / x          RADIOLOGY:  EXAM:  CT ABDOMEN AND PELVIS OC IC          PROCEDURE DATE:  02/07/2021      INTERPRETATION:  CLINICAL STATEMENT: Abdominal pain. History of colon cancer with lung metastasis.    TECHNIQUE: Contiguous axial CT images were obtained from the lower chest to the pubic symphysis following administration of intravenous contrast.  Oral contrast was administered.  Reformatted images in the coronal and sagittal planes were acquired.    COMPARISON CT: 9/20/2011.    OTHER STUDIES USED FOR CORRELATION: Abdominal radiograph dated 2/7/2021.      FINDINGS:    LOWER CHEST: Bilateral lower lung field peripheral and patchy faint groundglass opacities. Bibasilar atelectasis. Right lower lobe scarring with suture material, likely postsurgical..    HEPATOBILIARY: Trace nonspecific pericholecystic haziness. Hepatic steatosis..    SPLEEN: Unremarkable..    PANCREAS: Unremarkable..    ADRENAL GLANDS: Unremarkable..    KIDNEYS: Symmetric renal enhancement. No hydronephrosis. Bilateral renal cysts and additional subcentimeter hypodensities too small to characterize..    ABDOMINOPELVIC NODES: Unremarkable..    PELVIC ORGANS: Unremarkable..    PERITONEUM/MESENTERY/BOWEL: Post partial resection of the colon. No evidence of bowel obstruction, ascites or pneumoperitoneum. Normal appendix. Distal esophageal wall thickening. Diverticulosis..    BONES/SOFT TISSUES: Small periumbilical ventral hernia containing short segment nonobstructed small bowel loop. Degenerative changes of the spine with grade 1 anterolisthesis L4 on L5.    OTHER: Vascular calcification..    IMPRESSION:    Trace nonspecific pericholecystic haziness. Right upper quadrant ultrasound may be obtained for further evaluation.    Bilateral lower lung field peripheral and patchy faint groundglass opacities, nonspecific in nature, but can be seen with  atypical/viral pneumonia in appropriate clinical setting.    Distal esophageal wall thickening. Outpatient direct visualization is recommended    AKYE ARITA M.D., RESIDENT RADIOLOGIST  This document has been electronically signed.  FABI AGUILAR MD; Attending Radiologist  This document has been electronically signed. Feb 7 2021 12:45PM          EXAM:  US ABDOMEN LIMITED          PROCEDURE DATE:  02/07/2021      INTERPRETATION:  CLINICAL INFORMATION: Right upper quadrant pain.    COMPARISON: CT abdomen and pelvis of the same day.    TECHNIQUE: Sonography of the right upper quadrant.    FINDINGS:    Liver: Increased echogenicity.  Bile ducts: Normal caliber. Common bile duct measures 5 mm.  Gallbladder: Gallbladder sludge. Trace pericholecystic fluid. Gallbladder wall thickness measures 4 mm. Negative sonographic North's sign.  Pancreas: Obscured by overlying bowel gas.  Right kidney: 10.7 cm. No hydronephrosis. Right upper pole renal cyst measuring 3.3 cm.  Ascites: None.  IVC: Visualized portions are within normal limits.    IMPRESSION:    Layering gallbladder sludge withmild gallbladder wall thickening up to 4 mm and trace pericholecystic fluid. Although the sonographic North's sign is negative, findings are equivocal for acute cholecystitis. If clinically indicated, nuclear medicine HIDA scan can be obtained for further evaluation.    Hepatic steatosis.    Right upper pole renal cyst measuring 3.3 cm.    KAYE ARITA M.D., RESIDENT RADIOLOGIST  This document has been electronically signed.  FABI AGUILAR MD; Attending Radiologist  This document has beenelectronically signed. Feb 7 2021  3:02PM

## 2021-02-08 NOTE — PROGRESS NOTE ADULT - ASSESSMENT
64 yo male with PMHx of HTN, DM2, Lung cancer 2013 s/p chemo and right lung partial lobectomy, h/o colon cancer with partial colectomy,  recent Covid+7 days ago, who presented due to lower sternal pressure admittedfor suspected cholecystitis    #Acute COVID-19 viral infection  - CT chest with bilateral lower field mild opacities  - ptnt asymptomatic   - monitor SPO2     #?Suspected acute cholecystitis  - RUQ pain w/ deep palpation, negative North's  - US abdomen w/ gallbladder sludge with mild gallbladder wall thickening up to 4 mm and trace pericholecystic fluid  - currently NPO  - Sx following   - f/u HIDA  - if HIDA negative start on diet  - started on IV Cipro/Flagyl  - f/u GI reccs, ptnt was scheduled for endoscopy next week w/ Dr. Smith     #Distal Esophageal wall thickening noted on CT   - f/u GI for further reccs   - ptnt was scheduled for endoscopy next week     # JAMAAL   - hold HCTZ/Losartan  - c/w IVF   - monitor creatinine    #HTN  - c/w Norvasc, holding HCTZ/Losartan due to suspected JAMAAL  - monitor     #HLD: c/w home statin     #DM2  - holding home oral meds  - monitor FS and start on insulin regimen if consistently > 180       DVT ppx: Heparin 5000u subq q8 hours

## 2021-02-08 NOTE — PROGRESS NOTE ADULT - ASSESSMENT
Assessment:  65y Male patient admitted with left lower sternum pain, U/S showing GB sludge with GBW 4mm, CBD 5mm, trace PCF , with the above physical exam, labs, and imaging findings.    Plan:  - OP f/u with Dr. Rhoades (family requesting Dr. Rhoades)  - No acute surgical intervention at this time given COVID status  - recall as needed  -Pain control as needed  -Hemodynamic monitoring as per routine  -Encourage ambulation and incentive spirometer use (10x/hr when awake)  -GI and DVT prophylaxis  -Check and replete CBC and BMP q daily  -Strict input and output monitoring  -Continue current management    Date/Time: 02-08-21 @ 08:38

## 2021-02-08 NOTE — PROGRESS NOTE ADULT - SUBJECTIVE AND OBJECTIVE BOX
GLENAARONJOMAR  [facility] T1-3[facility] [address] ([facility] T[facility])            Patient was evaluated and examined  by bedside, reports no abdominal pain, no nausea, no diarrhea      REVIEW OF SYSTEMS:  please see pertinent positives mentioned above, all other 12 ROS negative      T(C): , Max: 37 (02-08-21 @ 05:38)  HR: 87 (02-08-21 @ 05:38)  BP: 150/92 (02-08-21 @ 05:38)  RR: 18 (02-08-21 @ 08:07)  SpO2: 97% (02-08-21 @ 08:07)  CAPILLARY BLOOD GLUCOSE          PHYSICAL EXAM:  General: NAD, AAOX3, patient is laying comfortably in bed, obese  HEENT: AT, NC, Supple, NO JVD, NO CB  Lungs: CTA B/L, no wheezing, no rhonchi  CVS: normal S1, S2, RRR, NO M/G/R  Abdomen: soft, bowel sounds present, non-tender, non-distended  Extremities: no edema, no clubbing, no cyanosis, positive peripheral pulses b/l  Neuro: no acute focal neurological deficits  Skin: no rash, no ecchymosis      LAB  CBC  Date: 02-07-21 @ 09:17  Mean cell Ctdxyadjfy92.7  Mean cell Hemoglobin Conc33.5  Mean cell Volum 85.6  Platelet count-Automate 294  RBC Count 5.57  Red Cell Distrib Width12.7  WBC Count9.34  % Albumin, Urine--  Hematocrit 47.7  Hemoglobin 16.0    BMP  02-07-21 @ 09:17  Blood Gas Arterial-Calcium,Ionized--  Blood Urea Nitrogen, Serum 25 mg/dL<H> [10 - 20]  Carbon Dioxide, Serum27 mmol/L [17 - 32]  Chloride, Yurws821 mmol/L [98 - 110]  Creatinie, Serum1.8 mg/dL<H> [0.7 - 1.5]  Glucose, Nfrgx380 mg/dL<H> [70 - 99]  Potassium, Serum3.6 mmol/L [3.5 - 5.0] [Slighty Hemolyzed use with Caution]  Sodium, Serum 144 mmol/L [135 - 146]      Medications:  acetaminophen   Tablet .. 650 milliGRAM(s) Oral every 6 hours PRN  aspirin enteric coated 81 milliGRAM(s) Oral daily  atorvastatin 10 milliGRAM(s) Oral at bedtime  chlorhexidine 4% Liquid 1 Application(s) Topical <User Schedule>  cholecalciferol 1000 Unit(s) Oral Once  ciprofloxacin   IVPB 400 milliGRAM(s) IV Intermittent every 12 hours  heparin   Injectable 5000 Unit(s) SubCutaneous every 8 hours  metroNIDAZOLE  IVPB 500 milliGRAM(s) IV Intermittent once  metroNIDAZOLE  IVPB 500 milliGRAM(s) IV Intermittent every 8 hours  metroNIDAZOLE  IVPB      sodium chloride 0.9%. 1000 milliLiter(s) IV Continuous <Continuous>        Assessment and Plan:  Patient is a  66 y/o male with PMH of HTN, DM2, Lung cancer 2013: right lung partial lobectomy, s/p Chemo, h/o Colon cancer with partial colectomy,  recent Covid +: 7 days ago, c/o lower sternal pressure and ground glass opacity on CT scan noted, suspected Cholecystitis, suspected JAMAAL.  Patient was  admitted to medical unit with the following course of therapy:    # Acute COVID-19 viral infection.  CT chest - b/l lower field mild opacities, clinically patient has no pulmonary symptoms, continue clinical  observation w/o meds tx. - continue to maintain isolation precautions    # Gallbladder sludge. Abn Abdominal US: gallbl. wall sludge with gall bladder wall thickening with pericholecystic fluid. normal LFT's  - f/up HIDA, if negative will start on diet  - started on IV Cipro/Flagyl  - Consulted GI - f/up recommendations  Seen by Surgical team: no interventions at present time    # Distal Esophageal wall thickening noted on CT - f/up GI for further recommendations    # Acute mild lactic acidosis- tx. underlying cause, f/up lactic acid today    # Acute kidney injury vs. Renal insufficiency - hold HCTZ/Losartan, IV hydration, f/up BMP today.    # Mild Acute hypercalcemia- possibly due to dehydration, f/up today's serum calcium levels    # Essential hypertension.  - continue BP med Norvasc: hold HCTZ/Losartan due to elevated Bun/Creat, DASH diet      # Type 2 diabetes mellitus.  -  Hold Metformin due to Renal Insufficiency, ADA diet. monitor bsfs    #  Hyperlipidemia.  continue Statin.     # h/o Obesity with BMI 35- outpatient weight loss tx. with lifestyle modification and exercise.     DVT proph.- Heparin 5000 units subc. every 8 hours    #Progress Note Handoff: f/up HIDA scan , if negative start PO diet, f/up GI. f/up BMP and Calcium level today. continue IVF  Family discussion: yes Disposition: Home__once medically stable

## 2021-02-09 ENCOUNTER — TRANSCRIPTION ENCOUNTER (OUTPATIENT)
Age: 66
End: 2021-02-09

## 2021-02-09 VITALS — RESPIRATION RATE: 18 BRPM | OXYGEN SATURATION: 94 %

## 2021-02-09 LAB
ALBUMIN SERPL ELPH-MCNC: 3.3 G/DL — LOW (ref 3.5–5.2)
ALP SERPL-CCNC: 46 U/L — SIGNIFICANT CHANGE UP (ref 30–115)
ALT FLD-CCNC: 29 U/L — SIGNIFICANT CHANGE UP (ref 0–41)
ANION GAP SERPL CALC-SCNC: 10 MMOL/L — SIGNIFICANT CHANGE UP (ref 7–14)
AST SERPL-CCNC: 19 U/L — SIGNIFICANT CHANGE UP (ref 0–41)
BILIRUB SERPL-MCNC: 0.5 MG/DL — SIGNIFICANT CHANGE UP (ref 0.2–1.2)
BUN SERPL-MCNC: 19 MG/DL — SIGNIFICANT CHANGE UP (ref 10–20)
CALCIUM SERPL-MCNC: 8.7 MG/DL — SIGNIFICANT CHANGE UP (ref 8.5–10.1)
CHLORIDE SERPL-SCNC: 104 MMOL/L — SIGNIFICANT CHANGE UP (ref 98–110)
CO2 SERPL-SCNC: 27 MMOL/L — SIGNIFICANT CHANGE UP (ref 17–32)
CREAT SERPL-MCNC: 1.2 MG/DL — SIGNIFICANT CHANGE UP (ref 0.7–1.5)
GLUCOSE BLDC GLUCOMTR-MCNC: 123 MG/DL — HIGH (ref 70–99)
GLUCOSE SERPL-MCNC: 87 MG/DL — SIGNIFICANT CHANGE UP (ref 70–99)
HCT VFR BLD CALC: 40 % — LOW (ref 42–52)
HGB BLD-MCNC: 13.8 G/DL — LOW (ref 14–18)
LACTATE SERPL-SCNC: 0.9 MMOL/L — SIGNIFICANT CHANGE UP (ref 0.7–2)
MAGNESIUM SERPL-MCNC: 1.8 MG/DL — SIGNIFICANT CHANGE UP (ref 1.8–2.4)
MCHC RBC-ENTMCNC: 29.7 PG — SIGNIFICANT CHANGE UP (ref 27–31)
MCHC RBC-ENTMCNC: 34.5 G/DL — SIGNIFICANT CHANGE UP (ref 32–37)
MCV RBC AUTO: 86.2 FL — SIGNIFICANT CHANGE UP (ref 80–94)
NRBC # BLD: 0 /100 WBCS — SIGNIFICANT CHANGE UP (ref 0–0)
PHOSPHATE SERPL-MCNC: 2.9 MG/DL — SIGNIFICANT CHANGE UP (ref 2.1–4.9)
PLATELET # BLD AUTO: 266 K/UL — SIGNIFICANT CHANGE UP (ref 130–400)
POTASSIUM SERPL-MCNC: 4 MMOL/L — SIGNIFICANT CHANGE UP (ref 3.5–5)
POTASSIUM SERPL-SCNC: 4 MMOL/L — SIGNIFICANT CHANGE UP (ref 3.5–5)
PROT SERPL-MCNC: 5.6 G/DL — LOW (ref 6–8)
RBC # BLD: 4.64 M/UL — LOW (ref 4.7–6.1)
RBC # FLD: 13.1 % — SIGNIFICANT CHANGE UP (ref 11.5–14.5)
SODIUM SERPL-SCNC: 141 MMOL/L — SIGNIFICANT CHANGE UP (ref 135–146)
WBC # BLD: 6.56 K/UL — SIGNIFICANT CHANGE UP (ref 4.8–10.8)
WBC # FLD AUTO: 6.56 K/UL — SIGNIFICANT CHANGE UP (ref 4.8–10.8)

## 2021-02-09 PROCEDURE — 99239 HOSP IP/OBS DSCHRG MGMT >30: CPT

## 2021-02-09 RX ORDER — LOSARTAN/HYDROCHLOROTHIAZIDE 100MG-25MG
1 TABLET ORAL
Qty: 0 | Refills: 0 | DISCHARGE

## 2021-02-09 RX ORDER — PANTOPRAZOLE SODIUM 20 MG/1
40 TABLET, DELAYED RELEASE ORAL DAILY
Refills: 0 | Status: DISCONTINUED | OUTPATIENT
Start: 2021-02-09 | End: 2021-02-09

## 2021-02-09 RX ORDER — AMLODIPINE BESYLATE 2.5 MG/1
5 TABLET ORAL DAILY
Refills: 0 | Status: DISCONTINUED | OUTPATIENT
Start: 2021-02-09 | End: 2021-02-09

## 2021-02-09 RX ORDER — AMLODIPINE BESYLATE 2.5 MG/1
1 TABLET ORAL
Qty: 0 | Refills: 0 | DISCHARGE

## 2021-02-09 RX ADMIN — HEPARIN SODIUM 5000 UNIT(S): 5000 INJECTION INTRAVENOUS; SUBCUTANEOUS at 12:05

## 2021-02-09 RX ADMIN — CHLORHEXIDINE GLUCONATE 1 APPLICATION(S): 213 SOLUTION TOPICAL at 05:06

## 2021-02-09 RX ADMIN — Medication 200 MILLIGRAM(S): at 05:06

## 2021-02-09 RX ADMIN — Medication 100 MILLIGRAM(S): at 05:06

## 2021-02-09 RX ADMIN — HEPARIN SODIUM 5000 UNIT(S): 5000 INJECTION INTRAVENOUS; SUBCUTANEOUS at 05:07

## 2021-02-09 RX ADMIN — Medication 81 MILLIGRAM(S): at 10:05

## 2021-02-09 RX ADMIN — PANTOPRAZOLE SODIUM 40 MILLIGRAM(S): 20 TABLET, DELAYED RELEASE ORAL at 10:05

## 2021-02-09 NOTE — DISCHARGE NOTE PROVIDER - NSDCCPCAREPLAN_GEN_ALL_CORE_FT
PRINCIPAL DISCHARGE DIAGNOSIS  Diagnosis: Abdominal pain  Assessment and Plan of Treatment: You came to the hospital for pain in your abdomen. An ultrasound and CT of your abdomen were performed which showed findings concerning for infection of your galbladder. You were seen by the surgery team for suspected infection and you underwent a HIDA scan to better visualize your gallbladder, this did not show infection of the galbladder.   The CT scan of your abdomen and pelvis showed a thickened wall of your esophagus. Please follow up with your gastroenterologist, Dr. Smith, within 2 weeks for an endoscopy, to visualize your esphagus.      SECONDARY DISCHARGE DIAGNOSES  Diagnosis: Hypotension  Assessment and Plan of Treatment: While you were at the hospital, we were holding your blood pressure medications for suspected kidney injury which resolved. Although you were not taking your blood pressure medications, your blod pressure was low to normal. Please monitor your blood pressure at home prior to restarting your blood pressure medications. Please follow up with your primary care physician prior to restarting your blood pressure medications.

## 2021-02-09 NOTE — DISCHARGE NOTE PROVIDER - NSDCFUADDINST_GEN_ALL_CORE_FT
Please follow up with your gastroenterologist, Dr. Smith, within 2 weeks for esophageal wall thickening seen on CT and with your primary care provider for your blood pressure. Please monitor your blood pressure regularly.

## 2021-02-09 NOTE — DISCHARGE NOTE NURSING/CASE MANAGEMENT/SOCIAL WORK - PATIENT PORTAL LINK FT
You can access the FollowMyHealth Patient Portal offered by [facility] by registering at the following website: [email or web address]. By joining [employer]’s FollowMyHealth portal, you will also be able to view your health information using other applications (apps) compatible with our system.

## 2021-02-09 NOTE — DISCHARGE NOTE PROVIDER - CARE PROVIDER_API CALL
Red Smith  GASTROENTEROLOGY  [address] [place] [id number]  Phone: [phone]  Fax: [phone]  Follow Up Time:

## 2021-02-09 NOTE — DISCHARGE NOTE PROVIDER - NSDCMRMEDTOKEN_GEN_ALL_CORE_FT
aspirin 81 mg oral tablet: 1 tab(s) orally once a day  Janumet 50 mg-500 mg oral tablet: orally once a day  pravastatin 20 mg oral tablet: 1 tab(s) orally once a day  Vitamin D3 1000 intl units (25 mcg) oral tablet: 1 tab(s) orally once a day

## 2021-02-09 NOTE — CHART NOTE - NSCHARTNOTEFT_GEN_A_CORE
I made rounds today with the treatment team including the hospitalist, residents,  nurses and  and discussed the patient's current medical status and discharge  planning needs, and reviewed the chart.    T(C): 36.9 (02-09-21 @ 06:04), Max: 36.9 (02-08-21 @ 21:21)  HR: 81 (02-09-21 @ 06:04) (70 - 81)  BP: 93/60 (02-09-21 @ 06:04) (93/60 - 132/79)  RR: 18 (02-09-21 @ 08:13) (17 - 18)  SpO2: 94% (02-09-21 @ 08:13) (94% - 95%)          I reached out to the patient's health care proxy/ responsible family member-           [  x   ]  I reached   Arlen ( wife ) [phone]    and discussed the patient's medical condition,                   family concerns, and discharge planning           [     ]  I left a message with family               [     ]  I personally participated in rounds with the medical team and my resident and discussed the case. My resident reached                   family member/ HCP                                under my direction and supervision  and we reviewed the conversation          [     ]  My resident left a message with family under my direction and supervision                [     ]   My resident attended rounds and called the family     The following was discussed: vitals / respiratory status / HIDA scan negative / GI eval for CT abd finding - distal esophageal thickening           [     ] I spent 5-10 minutes on the above discussing medical issues with team members and family and/ or my resident    [     ] I spent 11-20 minutes on the above discussing medical issues with team members and family and/ or my resident    [  x   ] I spent 21-30 minutes on the above discussing medical issues with team members and family and/ or my resident

## 2021-02-09 NOTE — PROGRESS NOTE ADULT - ATTENDING COMMENTS
I saw and evaluated patient  by bedside, no active complains, patient tolerating diet well , tolerating diet well.    All labs, radiology studies, VS was reviewed  I have reviewed the resident's note and agree with documented findings and  plan of care.  a/p:  Patient is a  64 y/o male with PMH of HTN, DM2, Lung cancer 2013: right lung partial lobectomy, s/p Chemo, h/o Colon cancer with partial colectomy,  recent Covid +: 7 days ago, c/o lower sternal pressure and ground glass opacity on CT scan noted, suspected Cholecystitis, suspected JAMAAL.  Patient was  admitted to medical unit with the following course of therapy:    # Acute COVID-19 viral infection.  CT chest - b/l lower field mild opacities, clinically patient has no pulmonary symptoms, no tx. required  - continue to maintain isolation precautions    # Gallbladder sludge. Abn Abdominal US: gallbl. wall sludge with gall bladder wall thickening with pericholecystic fluid. normal LFT's  - normal HIDA, patient tolerating diet well  - started on IV Cipro/Flagyl- d/c IV abx.  - Consulted GI - patient most likely will need outpatient diagnostic EGD for further eval. of distal esophageal thickening   Seen by Surgical team: no interventions at present time    # Distal Esophageal wall thickening noted on CT outpatient GI work up     # Acute mild lactic acidosis- tx. underlying cause, resolved     # Acute kidney injury - , renal function stabilized post IVF hydration,  continue to hold HCTZ/Losartan- due to low b/p    # Mild Acute hypercalcemia- due to dehydration, resolved post IVF hydration    # Essential hypertension.  - low b/p today, hold all meds till b/p stabilized, patient was instructed to monitor b/p at home.      # Type 2 diabetes mellitus.  - resume on home meds post d/c    #  Hyperlipidemia.  continue Statin.     # h/o Obesity with BMI 35- outpatient weight loss tx. with lifestyle modification and exercise.     DVT proph.- Heparin 5000 units subc. every 8 hours    #Progress Note Handoff: Patient was medically optimized, stable for d/c home if no further work up with GI.   Family discussion: yes Disposition: Home___today
Patient seen and examined with surgery resident on rounds and discussed management plans with patient and his son on phone. + Covid for 1 week with weakness at home new onset of epigastric discomfort over 24 hours requiring trip to ED for evaluation. Pain improved overnight but still with loss of appetite. Lab and sono and CT scan reviewed + sludge and some pericholecystic fluid consistent with acute cholecystitis. + diabetes controlled. Exam + obesity small umbilical hernia Minimal tenderness in RUQ. discussed with hospitalist and resident. Patient to be started on Iv antibiotics and observe for 24 hours if improving then start po and dc home on po antibiotics with outpatient followup for lap rony in 4-6 weeks.

## 2021-02-09 NOTE — DISCHARGE NOTE PROVIDER - NSDCHC_MEDRECSTATUS_GEN_ALL_CORE
normal/supple/no JVD Admission Reconciliation is Completed  Discharge Reconciliation is Not Complete Admission Reconciliation is Completed  Discharge Reconciliation is Completed

## 2021-02-09 NOTE — PROGRESS NOTE ADULT - ASSESSMENT
64 yo male with PMHx of HTN, DM2, Lung cancer 2013 s/p chemo and right lung partial lobectomy, h/o colon cancer with partial colectomy,  recent Covid+7 days ago, who presented due to lower sternal pressure admitted for suspected cholecystitis    #Acute COVID-19 viral infection  - CT chest with bilateral lower field mild opacities  - ptnt asymptomatic   - O2 sat 94-99% RA   - monitor SPO2     #RUQ abdominal pain, US layered sludge, trace pericholecystic fluid   - RUQ pain w/ deep palpation resolved   - US abdomen w/ gallbladder sludge with mild gallbladder wall thickening up to 4 mm and trace pericholecystic fluid  - HIDA scan negative  - ptnt started on diet 2/8, tolerating   - c/w IV Cipro/Flagyl  - per Sx, no current plan for intervention   - f/u GI reccs, Dr. Smith     #Distal Esophageal wall thickening noted on CT   - f/u GI for further reccs     # JAMAAL - improving   - holding HCTZ/Losartan  - monitor creatinine    #HTN  - c/w Norvasc, holding HCTZ/Losartan due to suspected JAMAAL  - monitor     #HLD: c/w home statin     #DM2  - holding home oral meds  - monitor FS and start on insulin regimen if consistently > 180       DVT ppx: Heparin 5000u subq q8 hours  GI ppx: Protonix 40   DIet: DASH/TLC CC low fat   Activity: IAT

## 2021-02-09 NOTE — DISCHARGE NOTE PROVIDER - HOSPITAL COURSE
64 y/o male with PMH of HTN, DM2, Lung cancer 2013: right lung partial lobectomy, s/p Chemo, h/o Colon cancer with partial colectomy,  recent Covid +: 7 prior to admission, c/o lower sternal pressure with ground glass opacity on CT scan. Admitted for suspected Cholecystitis. Patient was seen by surgery, US abdomen performed showing layering gallbladder sludge with mild gallbladder wall thickening up to 4 mm and trace pericholecystic fluid. CTAP showed trace nonspecific pericholecystic haziness, bilateral lower lung field peripheral and patchy faint groundglass opacities, nonspecific in nature, but can be seen with  atypical/viral pneumonia in appropriate clinical setting, and distal esophageal wall thickening. Outpatient direct visualization is recommended. Patient sees Dr. Smith outpatient, plan is for follow up with Dr. Smith for endoscopy outpatient.     Patient was NPO and underwent HIDA scan which was negative. He was started on diet, tolerated diet. SPO2 95% on RA. Patient stable for discharge.

## 2021-02-09 NOTE — PROGRESS NOTE ADULT - SUBJECTIVE AND OBJECTIVE BOX
SUBJECTIVE:    Patient is a 65y old Male who presents with a chief complaint of Left lower sternal area pressure (08 Feb 2021 11:58)    Currently admitted to medicine with the primary diagnosis of Abdominal pain       Today is hospital day 2d. This morning he is sitting comfortably in chair and reports no new issues or overnight events. Reports had BM this AM, urinating regularly, tolerating diet. Denies abdominal pain, nausea/vomiting.       PAST MEDICAL & SURGICAL HISTORY  COVID-19  2/2021    Type 2 diabetes mellitus    HLD (hyperlipidemia)    Lung cancer  2013: right lung: Chemo+Lobectomy    Colon cancer  2011: resection    Essential hypertension    S/P lobectomy of lung  Right lung    History of colon resection      SOCIAL HISTORY:  Negative for smoking/alcohol/drug use.     ALLERGIES:  No Known Allergies    MEDICATIONS:  STANDING MEDICATIONS  aspirin enteric coated 81 milliGRAM(s) Oral daily  atorvastatin 10 milliGRAM(s) Oral at bedtime  chlorhexidine 4% Liquid 1 Application(s) Topical <User Schedule>  cholecalciferol 1000 Unit(s) Oral Once  heparin   Injectable 5000 Unit(s) SubCutaneous every 8 hours  pantoprazole    Tablet 40 milliGRAM(s) Oral daily    PRN MEDICATIONS  acetaminophen   Tablet .. 650 milliGRAM(s) Oral every 6 hours PRN    VITALS:   T(F): 98.4  HR: 81  BP: 93/60  RR: 18  SpO2: 94%    PHYSICAL EXAM:  GEN: NAD, sitting in chair   LUNGS: Decreased breath sounds  HEART: Normal S1 and S2   Abdomen: Normoactive bowel sounds, non tender to palpation   Extremities: No edema  Neuro: A+Ox3, non focal       LABS:                        14.1   7.79  )-----------( 265      ( 08 Feb 2021 14:00 )             41.4     02-08    139  |  102  |  18  ----------------------------<  121<H>  4.0   |  23  |  1.2    Ca    9.2      08 Feb 2021 14:00  Phos  3.5     02-08  Mg     1.6     02-08    TPro  5.5<L>  /  Alb  3.5  /  TBili  0.7  /  DBili  x   /  AST  23  /  ALT  35  /  AlkPhos  55  02-08    PT/INR - ( 08 Feb 2021 17:08 )   PT: 12.40 sec;   INR: 1.08 ratio         PTT - ( 08 Feb 2021 14:00 )  PTT:31.0 sec      Lactate, Blood: 1.0 mmol/L (02-08-21 @ 17:08)      RADIOLOGY:  < from: [facility] Hepatobiliary [facility] (02.08.21 @ 12:38) >  NORMAL HEPATOBILIARY IMAGES.   DEFINITE VISUALIZATION OF THE GALLBLADDER BY 30 MINUTES POST INJECTION, DEMONSTRATING PATENCY OF THE CYSTIC DUCT.    < end of copied text >

## 2021-02-09 NOTE — PROGRESS NOTE ADULT - REASON FOR ADMISSION
Left lower sternal area pressure

## 2021-02-09 NOTE — DISCHARGE NOTE PROVIDER - EXTENDED VTE YES NO FOR MLM ENOXAPARIN
Patient contacted in follow up. She is doing well. She has adequate help from family members. She took Tylenol once yesterday for incisional discomfort. She also saw the surgeon yesterday and everything looked good. We spoke about gradually increasing exercise starting next week. No other needs identified. Will close case at this time but would be happy to work with patient in the future if needs arise.    
,

## 2021-02-10 ENCOUNTER — TRANSCRIPTION ENCOUNTER (OUTPATIENT)
Age: 66
End: 2021-02-10

## 2021-02-16 DIAGNOSIS — K22.8 OTHER SPECIFIED DISEASES OF ESOPHAGUS: ICD-10-CM

## 2021-02-16 DIAGNOSIS — Z79.84 LONG TERM (CURRENT) USE OF ORAL HYPOGLYCEMIC DRUGS: ICD-10-CM

## 2021-02-16 DIAGNOSIS — Z85.030 PERSONAL HISTORY OF MALIGNANT CARCINOID TUMOR OF LARGE INTESTINE: ICD-10-CM

## 2021-02-16 DIAGNOSIS — Z85.118 PERSONAL HISTORY OF OTHER MALIGNANT NEOPLASM OF BRONCHUS AND LUNG: ICD-10-CM

## 2021-02-16 DIAGNOSIS — E78.5 HYPERLIPIDEMIA, UNSPECIFIED: ICD-10-CM

## 2021-02-16 DIAGNOSIS — E86.0 DEHYDRATION: ICD-10-CM

## 2021-02-16 DIAGNOSIS — Z90.49 ACQUIRED ABSENCE OF OTHER SPECIFIED PARTS OF DIGESTIVE TRACT: ICD-10-CM

## 2021-02-16 DIAGNOSIS — J12.82 PNEUMONIA DUE TO CORONAVIRUS DISEASE 2019: ICD-10-CM

## 2021-02-16 DIAGNOSIS — E66.9 OBESITY, UNSPECIFIED: ICD-10-CM

## 2021-02-16 DIAGNOSIS — K83.8 OTHER SPECIFIED DISEASES OF BILIARY TRACT: ICD-10-CM

## 2021-02-16 DIAGNOSIS — I10 ESSENTIAL (PRIMARY) HYPERTENSION: ICD-10-CM

## 2021-02-16 DIAGNOSIS — N17.9 ACUTE KIDNEY FAILURE, UNSPECIFIED: ICD-10-CM

## 2021-02-16 DIAGNOSIS — Z90.2 ACQUIRED ABSENCE OF LUNG [PART OF]: ICD-10-CM

## 2021-02-16 DIAGNOSIS — R10.9 UNSPECIFIED ABDOMINAL PAIN: ICD-10-CM

## 2021-02-16 DIAGNOSIS — U07.1 COVID-19: ICD-10-CM

## 2021-02-16 DIAGNOSIS — Z92.21 PERSONAL HISTORY OF ANTINEOPLASTIC CHEMOTHERAPY: ICD-10-CM

## 2021-02-16 DIAGNOSIS — E11.9 TYPE 2 DIABETES MELLITUS WITHOUT COMPLICATIONS: ICD-10-CM

## 2021-02-16 DIAGNOSIS — E87.2 ACIDOSIS: ICD-10-CM

## 2021-02-16 DIAGNOSIS — K42.9 UMBILICAL HERNIA WITHOUT OBSTRUCTION OR GANGRENE: ICD-10-CM

## 2021-02-16 DIAGNOSIS — E83.52 HYPERCALCEMIA: ICD-10-CM

## 2021-02-16 PROBLEM — C18.9 MALIGNANT NEOPLASM OF COLON, UNSPECIFIED: Chronic | Status: ACTIVE | Noted: 2019-05-09

## 2021-02-16 PROBLEM — C34.90 MALIGNANT NEOPLASM OF UNSPECIFIED PART OF UNSPECIFIED BRONCHUS OR LUNG: Chronic | Status: ACTIVE | Noted: 2019-05-09

## 2021-03-12 ENCOUNTER — APPOINTMENT (OUTPATIENT)
Dept: CARDIOLOGY | Facility: CLINIC | Age: 66
End: 2021-03-12

## 2021-03-19 ENCOUNTER — RX RENEWAL (OUTPATIENT)
Age: 66
End: 2021-03-19

## 2021-03-25 ENCOUNTER — APPOINTMENT (OUTPATIENT)
Dept: CARDIOLOGY | Facility: CLINIC | Age: 66
End: 2021-03-25
Payer: COMMERCIAL

## 2021-03-25 VITALS
HEIGHT: 68 IN | WEIGHT: 223 LBS | DIASTOLIC BLOOD PRESSURE: 90 MMHG | BODY MASS INDEX: 33.8 KG/M2 | TEMPERATURE: 97.3 F | SYSTOLIC BLOOD PRESSURE: 130 MMHG | HEART RATE: 67 BPM

## 2021-03-25 DIAGNOSIS — Z78.9 OTHER SPECIFIED HEALTH STATUS: ICD-10-CM

## 2021-03-25 PROCEDURE — 99212 OFFICE O/P EST SF 10 MIN: CPT

## 2021-03-25 PROCEDURE — 93000 ELECTROCARDIOGRAM COMPLETE: CPT

## 2021-03-25 PROCEDURE — 99072 ADDL SUPL MATRL&STAF TM PHE: CPT

## 2021-03-25 NOTE — HISTORY OF PRESENT ILLNESS
[FreeTextEntry1] : pt is feeling well\par  no chest pains\par  pt had egd by Dr Smith\par  meds reviewed\par  blood work from 1/21 reviewed hgba1c 5.8 \par  diabetes is well controlled

## 2021-03-25 NOTE — ASSESSMENT
[FreeTextEntry1] : pt is feeling well\par  e k g nsr wnl\par  bp 120/80well controlled\par  meds reviewed\par

## 2021-04-01 ENCOUNTER — RX RENEWAL (OUTPATIENT)
Age: 66
End: 2021-04-01

## 2021-07-08 ENCOUNTER — APPOINTMENT (OUTPATIENT)
Dept: CARDIOLOGY | Facility: CLINIC | Age: 66
End: 2021-07-08
Payer: COMMERCIAL

## 2021-07-08 VITALS
SYSTOLIC BLOOD PRESSURE: 114 MMHG | BODY MASS INDEX: 31.98 KG/M2 | DIASTOLIC BLOOD PRESSURE: 82 MMHG | TEMPERATURE: 97.7 F | WEIGHT: 211 LBS | HEIGHT: 68 IN | HEART RATE: 61 BPM

## 2021-07-08 PROCEDURE — 99213 OFFICE O/P EST LOW 20 MIN: CPT

## 2021-07-08 PROCEDURE — 99072 ADDL SUPL MATRL&STAF TM PHE: CPT

## 2021-07-08 PROCEDURE — 93000 ELECTROCARDIOGRAM COMPLETE: CPT

## 2021-07-08 NOTE — HISTORY OF PRESENT ILLNESS
[FreeTextEntry1] : pt is on weight watchers lost 12 lbs\par  pt is feeling well\par  no chest pains\par  meds reviewed\par  bp 110/70

## 2021-07-08 NOTE — ASSESSMENT
[FreeTextEntry1] :  jonah welch nsr wnl\par  as his bp well contrled will d/c amlodipine\par  will get blood work\par  due to multiple cardiac risk factors will get stress santos

## 2021-07-09 ENCOUNTER — LABORATORY RESULT (OUTPATIENT)
Age: 66
End: 2021-07-09

## 2021-07-12 LAB
ALBUMIN SERPL ELPH-MCNC: 4.7 G/DL
ALP BLD-CCNC: 70 U/L
ALT SERPL-CCNC: 30 U/L
ANION GAP SERPL CALC-SCNC: 14 MMOL/L
AST SERPL-CCNC: 24 U/L
BASOPHILS # BLD AUTO: 0.07 K/UL
BASOPHILS NFR BLD AUTO: 1.1 %
BILIRUB SERPL-MCNC: 0.5 MG/DL
BUN SERPL-MCNC: 31 MG/DL
CALCIUM SERPL-MCNC: 10.3 MG/DL
CEA SERPL-MCNC: 5 NG/ML
CHLORIDE SERPL-SCNC: 101 MMOL/L
CHOLEST SERPL-MCNC: 161 MG/DL
CO2 SERPL-SCNC: 25 MMOL/L
CREAT SERPL-MCNC: 1.5 MG/DL
CREAT SPEC-SCNC: 112 MG/DL
EOSINOPHIL # BLD AUTO: 0.21 K/UL
EOSINOPHIL NFR BLD AUTO: 3.3 %
ESTIMATED AVERAGE GLUCOSE: 105 MG/DL
GLUCOSE SERPL-MCNC: 104 MG/DL
HBA1C MFR BLD HPLC: 5.3 %
HCT VFR BLD CALC: 47.5 %
HDLC SERPL-MCNC: 42 MG/DL
HGB BLD-MCNC: 15.8 G/DL
IMM GRANULOCYTES NFR BLD AUTO: 0.3 %
LDLC SERPL CALC-MCNC: 94 MG/DL
LYMPHOCYTES # BLD AUTO: 2.08 K/UL
LYMPHOCYTES NFR BLD AUTO: 32.4 %
MAN DIFF?: NORMAL
MCHC RBC-ENTMCNC: 28.9 PG
MCHC RBC-ENTMCNC: 33.3 G/DL
MCV RBC AUTO: 86.8 FL
MICROALBUMIN 24H UR DL<=1MG/L-MCNC: 57.2 MG/DL
MICROALBUMIN/CREAT 24H UR-RTO: 510 MG/G
MONOCYTES # BLD AUTO: 0.59 K/UL
MONOCYTES NFR BLD AUTO: 9.2 %
NEUTROPHILS # BLD AUTO: 3.45 K/UL
NEUTROPHILS NFR BLD AUTO: 53.7 %
NONHDLC SERPL-MCNC: 119 MG/DL
PLATELET # BLD AUTO: 218 K/UL
POTASSIUM SERPL-SCNC: 4.2 MMOL/L
PROT SERPL-MCNC: 7.2 G/DL
PSA SERPL-MCNC: 1.97 NG/ML
RBC # BLD: 5.47 M/UL
RBC # FLD: 13.2 %
SODIUM SERPL-SCNC: 140 MMOL/L
TRIGL SERPL-MCNC: 153 MG/DL
WBC # FLD AUTO: 6.42 K/UL

## 2021-08-05 ENCOUNTER — RX RENEWAL (OUTPATIENT)
Age: 66
End: 2021-08-05

## 2021-09-08 ENCOUNTER — APPOINTMENT (OUTPATIENT)
Dept: CARDIOLOGY | Facility: CLINIC | Age: 66
End: 2021-09-08
Payer: COMMERCIAL

## 2021-09-08 PROCEDURE — XXXXX: CPT

## 2021-09-13 ENCOUNTER — RX RENEWAL (OUTPATIENT)
Age: 66
End: 2021-09-13

## 2021-11-11 ENCOUNTER — APPOINTMENT (OUTPATIENT)
Dept: CARDIOLOGY | Facility: CLINIC | Age: 66
End: 2021-11-11
Payer: COMMERCIAL

## 2021-11-11 VITALS
SYSTOLIC BLOOD PRESSURE: 132 MMHG | TEMPERATURE: 98.7 F | HEIGHT: 68 IN | WEIGHT: 221 LBS | HEART RATE: 65 BPM | DIASTOLIC BLOOD PRESSURE: 98 MMHG | BODY MASS INDEX: 33.49 KG/M2

## 2021-11-11 PROCEDURE — 99213 OFFICE O/P EST LOW 20 MIN: CPT

## 2021-11-11 PROCEDURE — 93000 ELECTROCARDIOGRAM COMPLETE: CPT

## 2021-11-11 RX ORDER — METFORMIN HYDROCHLORIDE 500 MG/1
500 TABLET, COATED ORAL TWICE DAILY
Refills: 0 | Status: DISCONTINUED | COMMUNITY
End: 2021-11-11

## 2021-11-11 NOTE — HISTORY OF PRESENT ILLNESS
[FreeTextEntry1] : pt is feeling well\par  no chest pains\par  no exertional dyspnoea\par  meds reviewed\par  blood work from 7/21 reviewed all wnl\par  diabete is well controlled hgba1c was 5.3\par

## 2021-11-11 NOTE — ASSESSMENT
[FreeTextEntry1] : pt is feeling well\par  htn well controlled \par  e k g nsr wnl\par   meds reviewed\par  no cardiac sx

## 2021-12-24 ENCOUNTER — RX RENEWAL (OUTPATIENT)
Age: 66
End: 2021-12-24

## 2022-01-03 ENCOUNTER — APPOINTMENT (OUTPATIENT)
Dept: CARDIOLOGY | Facility: CLINIC | Age: 67
End: 2022-01-03

## 2022-03-28 ENCOUNTER — APPOINTMENT (OUTPATIENT)
Dept: CARDIOLOGY | Facility: CLINIC | Age: 67
End: 2022-03-28
Payer: MEDICARE

## 2022-03-28 ENCOUNTER — APPOINTMENT (OUTPATIENT)
Dept: CARDIOLOGY | Facility: CLINIC | Age: 67
End: 2022-03-28

## 2022-03-28 VITALS
SYSTOLIC BLOOD PRESSURE: 120 MMHG | HEIGHT: 68 IN | BODY MASS INDEX: 34.25 KG/M2 | TEMPERATURE: 98.7 F | DIASTOLIC BLOOD PRESSURE: 80 MMHG | WEIGHT: 226 LBS

## 2022-03-28 VITALS — HEART RATE: 71 BPM

## 2022-03-28 PROCEDURE — 93000 ELECTROCARDIOGRAM COMPLETE: CPT

## 2022-03-28 PROCEDURE — 99214 OFFICE O/P EST MOD 30 MIN: CPT

## 2022-03-28 RX ORDER — PANTOPRAZOLE 40 MG/1
40 TABLET, DELAYED RELEASE ORAL DAILY
Refills: 0 | Status: COMPLETED | COMMUNITY
End: 2022-03-28

## 2022-03-28 RX ORDER — AMLODIPINE BESYLATE 5 MG/1
5 TABLET ORAL DAILY
Qty: 90 | Refills: 1 | Status: COMPLETED | COMMUNITY
Start: 2019-10-18 | End: 2022-03-28

## 2022-03-28 RX ORDER — PRAVASTATIN SODIUM 40 MG/1
40 TABLET ORAL
Qty: 90 | Refills: 0 | Status: COMPLETED | COMMUNITY
Start: 2020-01-09 | End: 2022-03-28

## 2022-03-28 RX ORDER — FAMOTIDINE 40 MG/1
40 TABLET, FILM COATED ORAL
Qty: 90 | Refills: 3 | Status: ACTIVE | COMMUNITY
Start: 1900-01-01 | End: 1900-01-01

## 2022-03-28 RX ORDER — SITAGLIPTIN AND METFORMIN HYDROCHLORIDE 50; 500 MG/1; MG/1
50-500 TABLET, FILM COATED ORAL
Qty: 90 | Refills: 3 | Status: ACTIVE | COMMUNITY
Start: 2020-01-09 | End: 1900-01-01

## 2022-03-28 RX ORDER — SITAGLIPTIN AND METFORMIN HYDROCHLORIDE 50; 500 MG/1; MG/1
50-500 TABLET, FILM COATED ORAL TWICE DAILY
Qty: 180 | Refills: 1 | Status: COMPLETED | COMMUNITY
Start: 2019-06-25 | End: 2022-03-28

## 2022-03-28 NOTE — CARDIOLOGY SUMMARY
[de-identified] : 11/2021: NSR, normal\par 3- NSR Normal ECG .  [de-identified] : 09/2021: negative for ischemia

## 2022-03-28 NOTE — HISTORY OF PRESENT ILLNESS
[FreeTextEntry1] : 66 y.o. male with PMH of HTN, hyperlipidemia, colon CA and DM type II presents to establish care. He previously followed with . Most recent stress test was negative for ischemia (09/2021) at high level of exercise . The pateint has not had chest pain except fleeting pain  or SOB ,He is diabetic which is well controlled.\par \par \par \par

## 2022-03-28 NOTE — CARDIOLOGY SUMMARY
[de-identified] : 11/2021: NSR, normal\par 3- NSR Normal ECG .  [de-identified] : 09/2021: negative for ischemia

## 2022-03-28 NOTE — ASSESSMENT
[FreeTextEntry1] : The patient has Colon CA followed by Dr. Cartagena He is in Remission . 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 The patient has not had chest pain or SOB . uncertain what chemo regimen he has had . He had a negative ETT in 9-2021 . LDL was at goal.

## 2022-05-04 ENCOUNTER — APPOINTMENT (OUTPATIENT)
Dept: CARDIOLOGY | Facility: CLINIC | Age: 67
End: 2022-05-04
Payer: MEDICARE

## 2022-05-04 PROCEDURE — 93306 TTE W/DOPPLER COMPLETE: CPT

## 2022-05-22 ENCOUNTER — EMERGENCY (EMERGENCY)
Facility: HOSPITAL | Age: 67
LOS: 0 days | Discharge: HOME | End: 2022-05-22
Attending: EMERGENCY MEDICINE | Admitting: EMERGENCY MEDICINE
Payer: MEDICARE

## 2022-05-22 VITALS
SYSTOLIC BLOOD PRESSURE: 171 MMHG | HEIGHT: 68 IN | RESPIRATION RATE: 20 BRPM | TEMPERATURE: 97 F | OXYGEN SATURATION: 97 % | DIASTOLIC BLOOD PRESSURE: 90 MMHG | HEART RATE: 71 BPM | WEIGHT: 210.98 LBS

## 2022-05-22 DIAGNOSIS — Z90.2 ACQUIRED ABSENCE OF LUNG [PART OF]: ICD-10-CM

## 2022-05-22 DIAGNOSIS — Z79.84 LONG TERM (CURRENT) USE OF ORAL HYPOGLYCEMIC DRUGS: ICD-10-CM

## 2022-05-22 DIAGNOSIS — Z85.038 PERSONAL HISTORY OF OTHER MALIGNANT NEOPLASM OF LARGE INTESTINE: ICD-10-CM

## 2022-05-22 DIAGNOSIS — I10 ESSENTIAL (PRIMARY) HYPERTENSION: ICD-10-CM

## 2022-05-22 DIAGNOSIS — Z90.49 ACQUIRED ABSENCE OF OTHER SPECIFIED PARTS OF DIGESTIVE TRACT: Chronic | ICD-10-CM

## 2022-05-22 DIAGNOSIS — Z90.2 ACQUIRED ABSENCE OF LUNG [PART OF]: Chronic | ICD-10-CM

## 2022-05-22 DIAGNOSIS — E78.5 HYPERLIPIDEMIA, UNSPECIFIED: ICD-10-CM

## 2022-05-22 DIAGNOSIS — M79.89 OTHER SPECIFIED SOFT TISSUE DISORDERS: ICD-10-CM

## 2022-05-22 DIAGNOSIS — M25.531 PAIN IN RIGHT WRIST: ICD-10-CM

## 2022-05-22 DIAGNOSIS — M79.641 PAIN IN RIGHT HAND: ICD-10-CM

## 2022-05-22 DIAGNOSIS — Z85.118 PERSONAL HISTORY OF OTHER MALIGNANT NEOPLASM OF BRONCHUS AND LUNG: ICD-10-CM

## 2022-05-22 DIAGNOSIS — Z79.82 LONG TERM (CURRENT) USE OF ASPIRIN: ICD-10-CM

## 2022-05-22 DIAGNOSIS — Z86.16 PERSONAL HISTORY OF COVID-19: ICD-10-CM

## 2022-05-22 DIAGNOSIS — E11.9 TYPE 2 DIABETES MELLITUS WITHOUT COMPLICATIONS: ICD-10-CM

## 2022-05-22 PROCEDURE — 99284 EMERGENCY DEPT VISIT MOD MDM: CPT

## 2022-05-22 PROCEDURE — 73130 X-RAY EXAM OF HAND: CPT | Mod: 26,RT

## 2022-05-22 RX ORDER — SACCHAROMYCES BOULARDII 250 MG
1 POWDER IN PACKET (EA) ORAL
Qty: 20 | Refills: 0
Start: 2022-05-22 | End: 2022-05-31

## 2022-05-22 RX ORDER — CHOLECALCIFEROL (VITAMIN D3) 125 MCG
1 CAPSULE ORAL
Qty: 0 | Refills: 0 | DISCHARGE

## 2022-05-22 RX ORDER — CEPHALEXIN 500 MG
1 CAPSULE ORAL
Qty: 40 | Refills: 0
Start: 2022-05-22 | End: 2022-05-31

## 2022-05-22 RX ORDER — KETOROLAC TROMETHAMINE 30 MG/ML
30 SYRINGE (ML) INJECTION ONCE
Refills: 0 | Status: DISCONTINUED | OUTPATIENT
Start: 2022-05-22 | End: 2022-05-22

## 2022-05-22 RX ORDER — CEFAZOLIN SODIUM 1 G
2000 VIAL (EA) INJECTION ONCE
Refills: 0 | Status: COMPLETED | OUTPATIENT
Start: 2022-05-22 | End: 2022-05-22

## 2022-05-22 RX ORDER — FAMOTIDINE 10 MG/ML
0 INJECTION INTRAVENOUS
Qty: 0 | Refills: 0 | DISCHARGE

## 2022-05-22 RX ORDER — SITAGLIPTIN AND METFORMIN HYDROCHLORIDE 500; 50 MG/1; MG/1
0 TABLET, FILM COATED ORAL
Qty: 0 | Refills: 0 | DISCHARGE

## 2022-05-22 RX ORDER — LOSARTAN/HYDROCHLOROTHIAZIDE 100MG-25MG
0 TABLET ORAL
Qty: 0 | Refills: 0 | DISCHARGE

## 2022-05-22 RX ORDER — ASPIRIN/CALCIUM CARB/MAGNESIUM 324 MG
1 TABLET ORAL
Qty: 0 | Refills: 0 | DISCHARGE

## 2022-05-22 RX ADMIN — Medication 30 MILLIGRAM(S): at 20:37

## 2022-05-22 RX ADMIN — Medication 100 MILLIGRAM(S): at 20:36

## 2022-05-22 NOTE — ED PROVIDER NOTE - CARE PROVIDER_API CALL
Gorge Vasquez)  Orthopaedic Surgery  [address] [place] [address]  Phone: [phone]  Fax: [phone]  Follow Up Time: 1-3 Days

## 2022-05-22 NOTE — ED PROVIDER NOTE - PATIENT PORTAL LINK FT
You can access the FollowMyHealth Patient Portal offered by [facility] by registering at the following website: [email or web address]. By joining [employer]’s FollowMyHealth portal, you will also be able to view your health information using other applications (apps) compatible with our system.

## 2022-05-22 NOTE — ED PROVIDER NOTE - CLINICAL SUMMARY MEDICAL DECISION MAKING FREE TEXT BOX
67 yo man was working in the garden and the next day developed mid dorsal wrist pain with some erythema of skin over the area and mild swelling.  No fever,  Pain with palpation but nothing with passive ROM of joint.  Will try antibiotics and refer to hand surgery.

## 2022-05-22 NOTE — ED PROVIDER NOTE - OBJECTIVE STATEMENT
67 yo male, pmh of htn, hld, dm, presents to ed for right hand pain x 2 days, mild, aching, no radiation, atraumatic, noted swelling. denies fever, chills, numbness, tingling.

## 2022-05-22 NOTE — ED PROVIDER NOTE - PHYSICAL EXAMINATION
Physical Exam    Vital Signs: I have reviewed the initial vital signs.  Constitutional: appears stated age, no acute distress  Eyes: Conjunctiva pink, Sclera clear.   Gastrointestinal: soft, non-tender abdomen, no pulsatile mass, normal bowl sounds  Musculoskeletal: + ttp to dorsum of right hand, rom intact.   Integumentary: edema to dorsum of right hand  Neurologic: awake, alert, nvi

## 2022-09-23 RX ORDER — AZITHROMYCIN 500 MG/1
1 TABLET, FILM COATED ORAL
Qty: 1 | Refills: 0
Start: 2022-09-23 | End: 2022-09-27

## 2022-11-28 ENCOUNTER — APPOINTMENT (OUTPATIENT)
Dept: CARDIOLOGY | Facility: CLINIC | Age: 67
End: 2022-11-28

## 2022-11-28 ENCOUNTER — RESULT CHARGE (OUTPATIENT)
Age: 67
End: 2022-11-28

## 2022-11-28 VITALS
HEIGHT: 68 IN | BODY MASS INDEX: 33.95 KG/M2 | DIASTOLIC BLOOD PRESSURE: 78 MMHG | HEART RATE: 77 BPM | TEMPERATURE: 97.2 F | SYSTOLIC BLOOD PRESSURE: 122 MMHG | WEIGHT: 224 LBS

## 2022-11-28 PROCEDURE — 99214 OFFICE O/P EST MOD 30 MIN: CPT | Mod: 25

## 2022-11-28 PROCEDURE — 93000 ELECTROCARDIOGRAM COMPLETE: CPT

## 2022-11-28 RX ORDER — PSYLLIUM HUSK 0.4 G
CAPSULE ORAL
Refills: 0 | Status: ACTIVE | COMMUNITY

## 2022-11-28 NOTE — HISTORY OF PRESENT ILLNESS
[FreeTextEntry1] : 66 y.o. male with PMH of HTN, hyperlipidemia, colon CA and DM type II presents to establish care. He previously followed with . Most recent stress test was negative for ischemia (09/2021) at high level of exercise . The pateint has not had chest pain except fleeting pain  or SOB ,He is diabetic which is well controlled.The patient was found to have 3+ proteinuria and has CKD with creatinine of 1.6 . He needs to see nephrology for nephrotic syndrome . \par \par \par \par

## 2022-11-28 NOTE — ASSESSMENT
[FreeTextEntry1] : The patient has Colon CA followed by Dr. Cartagena He is in Remission . 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 The patient has not had chest pain or SOB . uncertain what chemo regimen he has had . He had a negative ETT in 9-2021 . Echo showed Nromal LV systolic functin  He does have 3+ proteinuria on UA .. At one time he had stopped his HCTZ and he developed edema in the past . Suspec tht he has had nephronic range proteinuria perhaps improved with ARB .

## 2022-11-28 NOTE — CARDIOLOGY SUMMARY
[de-identified] : 11/2021: NSR, normal\par 3- NSR Normal ECG . \par 11- NSR Normal ECG .  [de-identified] : 09/2021: negative for ischemia 9 minutes  [de-identified] : 5-4-2022 Normal LV systolic function mild MR mild TR .

## 2022-12-11 LAB
ALBUMIN SERPL ELPH-MCNC: 4.8 G/DL
ALP BLD-CCNC: 68 U/L
ALT SERPL-CCNC: 45 U/L
ANION GAP SERPL CALC-SCNC: 16 MMOL/L
AST SERPL-CCNC: 29 U/L
BASOPHILS # BLD AUTO: 0.07 K/UL
BASOPHILS NFR BLD AUTO: 1.1 %
BILIRUB SERPL-MCNC: 0.5 MG/DL
BUN SERPL-MCNC: 28 MG/DL
CALCIUM SERPL-MCNC: 10.7 MG/DL
CHLORIDE SERPL-SCNC: 99 MMOL/L
CHOLEST SERPL-MCNC: 182 MG/DL
CO2 SERPL-SCNC: 25 MMOL/L
CREAT SERPL-MCNC: 1.8 MG/DL
EGFR: 40 ML/MIN/1.73M2
EOSINOPHIL # BLD AUTO: 0.21 K/UL
EOSINOPHIL NFR BLD AUTO: 3.3 %
GLUCOSE SERPL-MCNC: 116 MG/DL
HCT VFR BLD CALC: 49.6 %
HDLC SERPL-MCNC: 46 MG/DL
HGB BLD-MCNC: 17.1 G/DL
IMM GRANULOCYTES NFR BLD AUTO: 0.3 %
LDLC SERPL CALC-MCNC: 104 MG/DL
LYMPHOCYTES # BLD AUTO: 2.1 K/UL
LYMPHOCYTES NFR BLD AUTO: 32.6 %
MAN DIFF?: NORMAL
MCHC RBC-ENTMCNC: 29.8 PG
MCHC RBC-ENTMCNC: 34.5 G/DL
MCV RBC AUTO: 86.6 FL
MONOCYTES # BLD AUTO: 0.59 K/UL
MONOCYTES NFR BLD AUTO: 9.1 %
NEUTROPHILS # BLD AUTO: 3.46 K/UL
NEUTROPHILS NFR BLD AUTO: 53.6 %
NONHDLC SERPL-MCNC: 136 MG/DL
PLATELET # BLD AUTO: 229 K/UL
POTASSIUM SERPL-SCNC: 3.8 MMOL/L
PROT SERPL-MCNC: 7.4 G/DL
RBC # BLD: 5.73 M/UL
RBC # FLD: 12.9 %
SODIUM SERPL-SCNC: 140 MMOL/L
T4 FREE SERPL-MCNC: 1.3 NG/DL
TRIGL SERPL-MCNC: 158 MG/DL
TSH SERPL-ACNC: 1.25 UIU/ML
WBC # FLD AUTO: 6.45 K/UL

## 2023-06-05 ENCOUNTER — APPOINTMENT (OUTPATIENT)
Dept: CARDIOLOGY | Facility: CLINIC | Age: 68
End: 2023-06-05
Payer: MEDICARE

## 2023-06-05 VITALS — DIASTOLIC BLOOD PRESSURE: 70 MMHG | SYSTOLIC BLOOD PRESSURE: 130 MMHG

## 2023-06-05 VITALS — DIASTOLIC BLOOD PRESSURE: 88 MMHG | HEIGHT: 68 IN | SYSTOLIC BLOOD PRESSURE: 160 MMHG | HEART RATE: 68 BPM

## 2023-06-05 DIAGNOSIS — C18.9 MALIGNANT NEOPLASM OF COLON, UNSPECIFIED: ICD-10-CM

## 2023-06-05 PROCEDURE — 93000 ELECTROCARDIOGRAM COMPLETE: CPT

## 2023-06-05 PROCEDURE — 99214 OFFICE O/P EST MOD 30 MIN: CPT | Mod: 25

## 2023-06-05 RX ORDER — DAPAGLIFLOZIN 10 MG/1
10 TABLET, FILM COATED ORAL
Qty: 90 | Refills: 0 | Status: ACTIVE | COMMUNITY
Start: 2023-04-03

## 2023-06-05 RX ORDER — LOSARTAN POTASSIUM AND HYDROCHLOROTHIAZIDE 25; 100 MG/1; MG/1
100-25 TABLET ORAL DAILY
Qty: 90 | Refills: 3 | Status: COMPLETED | COMMUNITY
Start: 2019-12-26 | End: 2023-06-05

## 2023-06-05 RX ORDER — LOSARTAN POTASSIUM AND HYDROCHLOROTHIAZIDE 12.5; 1 MG/1; MG/1
100-12.5 TABLET ORAL
Qty: 90 | Refills: 0 | Status: ACTIVE | COMMUNITY
Start: 2023-04-03

## 2023-06-05 NOTE — ASSESSMENT
[FreeTextEntry1] : The patient has Colon CA followed by Dr. Smith He is in Remission . 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 The patient has not had chest pain or SOB . uncertain what chemo regimen he has had . He had a negative ETT in 9-2021 . Echo showed Nromal LV systolic functin  He does have 3+ proteinuria on UA .. He had 2+ proteinuria on hi UA recently . Now on farxiga . HE does have CKD . He had 1640 on 24 hour urine collection .

## 2023-06-05 NOTE — HISTORY OF PRESENT ILLNESS
[FreeTextEntry1] : 66 y.o. male with PMH of HTN, hyperlipidemia, colon CA and DM type II presents to establish care. He previously followed with . Most recent stress test was negative for ischemia (09/2021) at high level of exercise . The pateint has not had chest pain except fleeting pain  or SOB ,He is diabetic which is well controlled.The patient was found to have 3+ proteinuria and has CKD with creatinine of 1.6 . He needs to see nephrology for nephrotic syndrome . The patient has not had chest pain or SOB . \par \par \par \par

## 2023-06-05 NOTE — CARDIOLOGY SUMMARY
[de-identified] : 11/2021: NSR, normal\par 3- NSR Normal ECG . \par 11- NSR Normal ECG . \par 6-5-2023 NSR Normal ECG .  [de-identified] : 09/2021: negative for ischemia 9 minutes  [de-identified] : 5-4-2022 Normal LV systolic function mild MR mild TR .

## 2023-06-11 LAB
ALBUMIN SERPL ELPH-MCNC: 4.7 G/DL
ALP BLD-CCNC: 72 U/L
ALT SERPL-CCNC: 43 U/L
ANION GAP SERPL CALC-SCNC: 16 MMOL/L
AST SERPL-CCNC: 30 U/L
BILIRUB SERPL-MCNC: 0.6 MG/DL
BUN SERPL-MCNC: 30 MG/DL
CALCIUM SERPL-MCNC: 10.9 MG/DL
CHLORIDE SERPL-SCNC: 101 MMOL/L
CHOLEST SERPL-MCNC: 200 MG/DL
CO2 SERPL-SCNC: 25 MMOL/L
CREAT SERPL-MCNC: 2 MG/DL
EGFR: 36 ML/MIN/1.73M2
ESTIMATED AVERAGE GLUCOSE: 126 MG/DL
GLUCOSE SERPL-MCNC: 109 MG/DL
HBA1C MFR BLD HPLC: 6 %
HDLC SERPL-MCNC: 47 MG/DL
LDLC SERPL CALC-MCNC: 117 MG/DL
NONHDLC SERPL-MCNC: 153 MG/DL
POTASSIUM SERPL-SCNC: 4.7 MMOL/L
PROT SERPL-MCNC: 7.5 G/DL
SODIUM SERPL-SCNC: 142 MMOL/L
TRIGL SERPL-MCNC: 179 MG/DL

## 2023-06-16 ENCOUNTER — NON-APPOINTMENT (OUTPATIENT)
Age: 68
End: 2023-06-16

## 2023-06-16 RX ORDER — PRAVASTATIN SODIUM 20 MG/1
20 TABLET ORAL
Qty: 90 | Refills: 3 | Status: DISCONTINUED | COMMUNITY
Start: 2019-12-26 | End: 2023-06-16

## 2023-09-27 ENCOUNTER — APPOINTMENT (OUTPATIENT)
Dept: CARDIOLOGY | Facility: CLINIC | Age: 68
End: 2023-09-27
Payer: MEDICARE

## 2023-09-27 PROCEDURE — 93351 STRESS TTE COMPLETE: CPT

## 2023-09-27 PROCEDURE — 93320 DOPPLER ECHO COMPLETE: CPT

## 2023-09-27 PROCEDURE — 93325 DOPPLER ECHO COLOR FLOW MAPG: CPT

## 2023-11-24 LAB
ALBUMIN SERPL ELPH-MCNC: 4.4 G/DL
ALP BLD-CCNC: 84 U/L
ALT SERPL-CCNC: 45 U/L
ANION GAP SERPL CALC-SCNC: 18 MMOL/L
AST SERPL-CCNC: 29 U/L
BILIRUB SERPL-MCNC: 0.5 MG/DL
BUN SERPL-MCNC: 29 MG/DL
CALCIUM SERPL-MCNC: 10.3 MG/DL
CHLORIDE SERPL-SCNC: 103 MMOL/L
CHOLEST SERPL-MCNC: 169 MG/DL
CO2 SERPL-SCNC: 23 MMOL/L
CREAT SERPL-MCNC: 1.8 MG/DL
EGFR: 40 ML/MIN/1.73M2
GLUCOSE SERPL-MCNC: 92 MG/DL
HDLC SERPL-MCNC: 42 MG/DL
LDLC SERPL CALC-MCNC: 93 MG/DL
NONHDLC SERPL-MCNC: 127 MG/DL
POTASSIUM SERPL-SCNC: 4.3 MMOL/L
PROT SERPL-MCNC: 7.1 G/DL
SODIUM SERPL-SCNC: 144 MMOL/L
T4 FREE SERPL-MCNC: 1.2 NG/DL
TRIGL SERPL-MCNC: 168 MG/DL
TSH SERPL-ACNC: 1.95 UIU/ML

## 2023-12-04 ENCOUNTER — APPOINTMENT (OUTPATIENT)
Dept: CARDIOLOGY | Facility: CLINIC | Age: 68
End: 2023-12-04
Payer: MEDICARE

## 2023-12-04 VITALS
SYSTOLIC BLOOD PRESSURE: 122 MMHG | BODY MASS INDEX: 33.34 KG/M2 | HEIGHT: 68 IN | DIASTOLIC BLOOD PRESSURE: 84 MMHG | HEART RATE: 70 BPM | WEIGHT: 220 LBS

## 2023-12-04 VITALS — SYSTOLIC BLOOD PRESSURE: 120 MMHG | DIASTOLIC BLOOD PRESSURE: 70 MMHG

## 2023-12-04 DIAGNOSIS — C18.9 MALIGNANT NEOPLASM OF COLON, UNSPECIFIED: ICD-10-CM

## 2023-12-04 PROCEDURE — 99214 OFFICE O/P EST MOD 30 MIN: CPT | Mod: 25

## 2023-12-04 PROCEDURE — 93000 ELECTROCARDIOGRAM COMPLETE: CPT

## 2024-01-03 NOTE — DISCHARGE NOTE NURSING/CASE MANAGEMENT/SOCIAL WORK - NSFLUVACAGEDISCH_IMM_ALL_CORE
Called patient advised patient to call the original urologist office that did the procedure to have a new referral made will call back with the doctors information.   Adult

## 2024-05-12 LAB
ALBUMIN SERPL ELPH-MCNC: 4.5 G/DL
ALP BLD-CCNC: 79 U/L
ALT SERPL-CCNC: 33 U/L
ANION GAP SERPL CALC-SCNC: 13 MMOL/L
AST SERPL-CCNC: 26 U/L
BASOPHILS # BLD AUTO: 0.06 K/UL
BASOPHILS NFR BLD AUTO: 0.8 %
BILIRUB SERPL-MCNC: 0.5 MG/DL
BUN SERPL-MCNC: 26 MG/DL
CALCIUM SERPL-MCNC: 10.6 MG/DL
CHLORIDE SERPL-SCNC: 105 MMOL/L
CHOLEST SERPL-MCNC: 174 MG/DL
CO2 SERPL-SCNC: 27 MMOL/L
CREAT SERPL-MCNC: 1.8 MG/DL
EGFR: 40 ML/MIN/1.73M2
EOSINOPHIL # BLD AUTO: 0.26 K/UL
EOSINOPHIL NFR BLD AUTO: 3.6 %
ESTIMATED AVERAGE GLUCOSE: 123 MG/DL
GLUCOSE SERPL-MCNC: 102 MG/DL
HBA1C MFR BLD HPLC: 5.9 %
HCT VFR BLD CALC: 53.3 %
HDLC SERPL-MCNC: 43 MG/DL
HGB BLD-MCNC: 17.6 G/DL
IMM GRANULOCYTES NFR BLD AUTO: 0.4 %
LDLC SERPL CALC-MCNC: 99 MG/DL
LYMPHOCYTES # BLD AUTO: 2.87 K/UL
LYMPHOCYTES NFR BLD AUTO: 39.5 %
MAN DIFF?: NORMAL
MCHC RBC-ENTMCNC: 29.8 PG
MCHC RBC-ENTMCNC: 33 G/DL
MCV RBC AUTO: 90.3 FL
MONOCYTES # BLD AUTO: 0.59 K/UL
MONOCYTES NFR BLD AUTO: 8.1 %
NEUTROPHILS # BLD AUTO: 3.46 K/UL
NEUTROPHILS NFR BLD AUTO: 47.6 %
NONHDLC SERPL-MCNC: 131 MG/DL
PLATELET # BLD AUTO: 215 K/UL
PMV BLD AUTO: 0 /100 WBCS
POTASSIUM SERPL-SCNC: 5 MMOL/L
PROT SERPL-MCNC: 7.4 G/DL
RBC # BLD: 5.9 M/UL
RBC # FLD: 13.5 %
SODIUM SERPL-SCNC: 145 MMOL/L
TRIGL SERPL-MCNC: 160 MG/DL
WBC # FLD AUTO: 7.27 K/UL

## 2024-05-22 ENCOUNTER — APPOINTMENT (OUTPATIENT)
Dept: CARDIOLOGY | Facility: CLINIC | Age: 69
End: 2024-05-22
Payer: MEDICARE

## 2024-05-22 VITALS — DIASTOLIC BLOOD PRESSURE: 80 MMHG | HEART RATE: 74 BPM | SYSTOLIC BLOOD PRESSURE: 124 MMHG

## 2024-05-22 VITALS — BODY MASS INDEX: 33.19 KG/M2 | TEMPERATURE: 97.6 F | WEIGHT: 219 LBS | HEIGHT: 68 IN

## 2024-05-22 DIAGNOSIS — I10 ESSENTIAL (PRIMARY) HYPERTENSION: ICD-10-CM

## 2024-05-22 DIAGNOSIS — E78.5 HYPERLIPIDEMIA, UNSPECIFIED: ICD-10-CM

## 2024-05-22 DIAGNOSIS — E11.9 TYPE 2 DIABETES MELLITUS W/OUT COMPLICATIONS: ICD-10-CM

## 2024-05-22 PROCEDURE — 99214 OFFICE O/P EST MOD 30 MIN: CPT | Mod: 25

## 2024-05-22 PROCEDURE — 93000 ELECTROCARDIOGRAM COMPLETE: CPT

## 2024-05-22 RX ORDER — ATORVASTATIN CALCIUM 40 MG/1
40 TABLET, FILM COATED ORAL
Qty: 90 | Refills: 3 | Status: ACTIVE | COMMUNITY
Start: 2023-06-16 | End: 1900-01-01

## 2024-05-22 NOTE — ASSESSMENT
[FreeTextEntry1] : The patient has HTN and DM and increased cholesterol . The patient has not had chest pain . ETT Echo was negative for ischemia at moderate exercise load  .He has proteinuria and CKD . He is on HCTZ and Farxiga . Creatinine was 1.8 . Discussed risk factor reduction with patient . LDL is <100 . Ischemia work up was negative . 
Clothing

## 2024-05-22 NOTE — CARDIOLOGY SUMMARY
[de-identified] : 11/2021: NSR, normal 3- NSR Normal ECG .  11- NSR Normal ECG .  12-4-2023 NSR Canot R/O Old IWMI . Similar to previous ECG   6-5-2023 NSR Normal ECG .  5- NSR first degree AV block  [de-identified] : 09/2021: negative for ischemia 9 minutes  9- ETT Echo completed stage II No ischemia . Trace MR mild TR .  [de-identified] : 5-4-2022 Normal LV systolic function mild MR mild TR .

## 2024-05-22 NOTE — HISTORY OF PRESENT ILLNESS
[FreeTextEntry1] : The patient has been feeling well. The patient had a stress echo was negative for ischemia at 6 minutes . He has had no chest pain .

## 2024-06-09 ENCOUNTER — RX RENEWAL (OUTPATIENT)
Age: 69
End: 2024-06-09

## 2024-06-26 ENCOUNTER — LABORATORY RESULT (OUTPATIENT)
Age: 69
End: 2024-06-26

## 2024-06-30 LAB
ALBUMIN SERPL ELPH-MCNC: 5 G/DL
ALP BLD-CCNC: 83 U/L
ALT SERPL-CCNC: 42 U/L
ANION GAP SERPL CALC-SCNC: 17 MMOL/L
AST SERPL-CCNC: 28 U/L
BASOPHILS # BLD AUTO: 0.08 K/UL
BASOPHILS NFR BLD AUTO: 1.1 %
BILIRUB SERPL-MCNC: 0.6 MG/DL
BUN SERPL-MCNC: 30 MG/DL
CALCIUM SERPL-MCNC: 10.6 MG/DL
CHLORIDE SERPL-SCNC: 102 MMOL/L
CHOLEST SERPL-MCNC: 171 MG/DL
CO2 SERPL-SCNC: 28 MMOL/L
CREAT SERPL-MCNC: 2 MG/DL
EGFR: 35 ML/MIN/1.73M2
EOSINOPHIL # BLD AUTO: 0.23 K/UL
EOSINOPHIL NFR BLD AUTO: 3.1 %
ESTIMATED AVERAGE GLUCOSE: 134 MG/DL
GLUCOSE SERPL-MCNC: 115 MG/DL
HBA1C MFR BLD HPLC: 6.3 %
HCT VFR BLD CALC: 55.2 %
HDLC SERPL-MCNC: 37 MG/DL
HGB BLD-MCNC: 17.7 G/DL
IMM GRANULOCYTES NFR BLD AUTO: 0.4 %
LDLC SERPL CALC-MCNC: 97 MG/DL
LYMPHOCYTES # BLD AUTO: 2.75 K/UL
LYMPHOCYTES NFR BLD AUTO: 37.5 %
MAN DIFF?: NORMAL
MCHC RBC-ENTMCNC: 28.7 PG
MCHC RBC-ENTMCNC: 32.1 G/DL
MCV RBC AUTO: 89.6 FL
MONOCYTES # BLD AUTO: 0.66 K/UL
MONOCYTES NFR BLD AUTO: 9 %
NEUTROPHILS # BLD AUTO: 3.58 K/UL
NEUTROPHILS NFR BLD AUTO: 48.9 %
NONHDLC SERPL-MCNC: 134 MG/DL
PLATELET # BLD AUTO: 244 K/UL
PMV BLD AUTO: 0 /100 WBCS
POTASSIUM SERPL-SCNC: 5.2 MMOL/L
PROT SERPL-MCNC: 7.5 G/DL
RBC # BLD: 6.16 M/UL
RBC # FLD: 13.5 %
SODIUM SERPL-SCNC: 147 MMOL/L
TRIGL SERPL-MCNC: 184 MG/DL
WBC # FLD AUTO: 7.33 K/UL

## 2024-12-15 LAB
ALBUMIN SERPL ELPH-MCNC: 4.6 G/DL
ALP BLD-CCNC: 85 U/L
ALT SERPL-CCNC: 39 U/L
ANION GAP SERPL CALC-SCNC: 16 MMOL/L
AST SERPL-CCNC: 23 U/L
BILIRUB SERPL-MCNC: 0.4 MG/DL
BUN SERPL-MCNC: 37 MG/DL
CALCIUM SERPL-MCNC: 10.7 MG/DL
CHLORIDE SERPL-SCNC: 102 MMOL/L
CHOLEST SERPL-MCNC: 202 MG/DL
CO2 SERPL-SCNC: 26 MMOL/L
CREAT SERPL-MCNC: 1.9 MG/DL
EGFR: 37 ML/MIN/1.73M2
ESTIMATED AVERAGE GLUCOSE: 131 MG/DL
GLUCOSE SERPL-MCNC: 138 MG/DL
HBA1C MFR BLD HPLC: 6.2 %
HCT VFR BLD CALC: 53.3 %
HDLC SERPL-MCNC: 43 MG/DL
HGB BLD-MCNC: 17.8 G/DL
LDLC SERPL CALC-MCNC: 116 MG/DL
MCHC RBC-ENTMCNC: 29.7 PG
MCHC RBC-ENTMCNC: 33.4 G/DL
MCV RBC AUTO: 88.8 FL
NONHDLC SERPL-MCNC: 159 MG/DL
PLATELET # BLD AUTO: 232 K/UL
PMV BLD AUTO: 0 /100 WBCS
PMV BLD: 9.6 FL
POTASSIUM SERPL-SCNC: 5.1 MMOL/L
PROT SERPL-MCNC: 7.3 G/DL
RBC # BLD: 6 M/UL
RBC # FLD: 13.3 %
SODIUM SERPL-SCNC: 144 MMOL/L
TRIGL SERPL-MCNC: 216 MG/DL
WBC # FLD AUTO: 7.72 K/UL

## 2024-12-18 ENCOUNTER — APPOINTMENT (OUTPATIENT)
Dept: CARDIOLOGY | Facility: CLINIC | Age: 69
End: 2024-12-18
Payer: MEDICARE

## 2024-12-18 VITALS — SYSTOLIC BLOOD PRESSURE: 128 MMHG | HEART RATE: 85 BPM | DIASTOLIC BLOOD PRESSURE: 88 MMHG

## 2024-12-18 VITALS — HEIGHT: 68 IN | BODY MASS INDEX: 33.34 KG/M2 | WEIGHT: 220 LBS

## 2024-12-18 PROCEDURE — 93000 ELECTROCARDIOGRAM COMPLETE: CPT

## 2024-12-18 PROCEDURE — 99214 OFFICE O/P EST MOD 30 MIN: CPT | Mod: 25

## 2025-01-06 NOTE — ED PROVIDER NOTE - IV ALTEPLASE EXCL ABS HIDDEN
Pt changing insurance to [employer]    No name on card is fine per [place]- insurance to update file to [id number] from [id number] is old address.    Can still see Dr Boby Varghese.      
show

## 2025-06-11 ENCOUNTER — NON-APPOINTMENT (OUTPATIENT)
Age: 70
End: 2025-06-11

## 2025-06-11 ENCOUNTER — APPOINTMENT (OUTPATIENT)
Dept: CARDIOLOGY | Facility: CLINIC | Age: 70
End: 2025-06-11
Payer: MEDICARE

## 2025-06-11 VITALS — TEMPERATURE: 97.6 F | HEIGHT: 68 IN | BODY MASS INDEX: 33.34 KG/M2 | WEIGHT: 220 LBS

## 2025-06-11 VITALS — DIASTOLIC BLOOD PRESSURE: 64 MMHG | SYSTOLIC BLOOD PRESSURE: 120 MMHG | HEART RATE: 70 BPM

## 2025-06-11 PROCEDURE — 93000 ELECTROCARDIOGRAM COMPLETE: CPT

## 2025-06-11 PROCEDURE — 99214 OFFICE O/P EST MOD 30 MIN: CPT

## 2025-06-11 RX ORDER — OMEGA-3/DHA/EPA/FISH OIL 60 MG-90MG
500 CAPSULE ORAL
Refills: 0 | Status: ACTIVE | COMMUNITY

## 2025-06-11 RX ORDER — ATORVASTATIN CALCIUM 40 MG/1
40 TABLET, FILM COATED ORAL
Refills: 0 | Status: ACTIVE | COMMUNITY

## 2025-07-02 ENCOUNTER — APPOINTMENT (OUTPATIENT)
Dept: CARDIOLOGY | Facility: CLINIC | Age: 70
End: 2025-07-02
Payer: MEDICARE

## 2025-07-02 VITALS
BODY MASS INDEX: 33.19 KG/M2 | SYSTOLIC BLOOD PRESSURE: 115 MMHG | DIASTOLIC BLOOD PRESSURE: 79 MMHG | WEIGHT: 219 LBS | HEIGHT: 68 IN | HEART RATE: 92 BPM

## 2025-07-02 PROCEDURE — 99204 OFFICE O/P NEW MOD 45 MIN: CPT

## 2025-07-02 RX ORDER — TIRZEPATIDE 2.5 MG/.5ML
2.5 INJECTION, SOLUTION SUBCUTANEOUS
Qty: 4 | Refills: 0 | Status: ACTIVE | COMMUNITY
Start: 2025-07-02 | End: 1900-01-01

## 2025-07-02 RX ORDER — FINERENONE 10 MG/1
10 TABLET, FILM COATED ORAL DAILY
Qty: 90 | Refills: 0 | Status: ACTIVE | COMMUNITY
Start: 2025-07-02

## 2025-07-03 ENCOUNTER — OUTPATIENT (OUTPATIENT)
Dept: OUTPATIENT SERVICES | Facility: HOSPITAL | Age: 70
LOS: 1 days | Discharge: HOME | End: 2025-07-03
Payer: MEDICARE

## 2025-07-03 ENCOUNTER — APPOINTMENT (OUTPATIENT)
Dept: SLEEP CENTER | Facility: HOSPITAL | Age: 70
End: 2025-07-03

## 2025-07-03 DIAGNOSIS — Z90.49 ACQUIRED ABSENCE OF OTHER SPECIFIED PARTS OF DIGESTIVE TRACT: Chronic | ICD-10-CM

## 2025-07-03 DIAGNOSIS — Z90.2 ACQUIRED ABSENCE OF LUNG [PART OF]: Chronic | ICD-10-CM

## 2025-07-03 PROCEDURE — 95806 SLEEP STUDY UNATT&RESP EFFT: CPT

## 2025-07-03 PROCEDURE — 95800 SLP STDY UNATTENDED: CPT | Mod: 26

## 2025-07-09 ENCOUNTER — OUTPATIENT (OUTPATIENT)
Dept: OUTPATIENT SERVICES | Facility: HOSPITAL | Age: 70
LOS: 1 days | End: 2025-07-09
Payer: SELF-PAY

## 2025-07-09 ENCOUNTER — RESULT REVIEW (OUTPATIENT)
Age: 70
End: 2025-07-09

## 2025-07-09 DIAGNOSIS — Z90.2 ACQUIRED ABSENCE OF LUNG [PART OF]: Chronic | ICD-10-CM

## 2025-07-09 DIAGNOSIS — Z90.49 ACQUIRED ABSENCE OF OTHER SPECIFIED PARTS OF DIGESTIVE TRACT: Chronic | ICD-10-CM

## 2025-07-09 DIAGNOSIS — I25.10 ATHEROSCLEROTIC HEART DISEASE OF NATIVE CORONARY ARTERY WITHOUT ANGINA PECTORIS: ICD-10-CM

## 2025-07-09 DIAGNOSIS — I10 ESSENTIAL (PRIMARY) HYPERTENSION: ICD-10-CM

## 2025-07-09 DIAGNOSIS — G47.33 OBSTRUCTIVE SLEEP APNEA (ADULT) (PEDIATRIC): ICD-10-CM

## 2025-07-09 DIAGNOSIS — E78.5 HYPERLIPIDEMIA, UNSPECIFIED: ICD-10-CM

## 2025-07-09 PROCEDURE — 75571 CT HRT W/O DYE W/CA TEST: CPT

## 2025-07-09 PROCEDURE — 75571 CT HRT W/O DYE W/CA TEST: CPT | Mod: 26

## 2025-07-10 DIAGNOSIS — E78.5 HYPERLIPIDEMIA, UNSPECIFIED: ICD-10-CM

## 2025-07-10 DIAGNOSIS — I10 ESSENTIAL (PRIMARY) HYPERTENSION: ICD-10-CM

## 2025-07-10 DIAGNOSIS — G47.33 OBSTRUCTIVE SLEEP APNEA (ADULT) (PEDIATRIC): ICD-10-CM

## 2025-07-21 ENCOUNTER — APPOINTMENT (OUTPATIENT)
Dept: PULMONOLOGY | Facility: CLINIC | Age: 70
End: 2025-07-21
Payer: MEDICARE

## 2025-07-21 VITALS
RESPIRATION RATE: 14 BRPM | WEIGHT: 218 LBS | BODY MASS INDEX: 33.04 KG/M2 | HEIGHT: 68 IN | DIASTOLIC BLOOD PRESSURE: 72 MMHG | OXYGEN SATURATION: 97 % | HEART RATE: 76 BPM | SYSTOLIC BLOOD PRESSURE: 110 MMHG

## 2025-07-21 DIAGNOSIS — Z85.038 PERSONAL HISTORY OF OTHER MALIGNANT NEOPLASM OF LARGE INTESTINE: ICD-10-CM

## 2025-07-21 PROCEDURE — 99213 OFFICE O/P EST LOW 20 MIN: CPT

## 2025-07-21 PROCEDURE — G2211 COMPLEX E/M VISIT ADD ON: CPT

## 2025-07-31 ENCOUNTER — APPOINTMENT (OUTPATIENT)
Dept: CARDIOLOGY | Facility: CLINIC | Age: 70
End: 2025-07-31
Payer: MEDICARE

## 2025-07-31 ENCOUNTER — RESULT REVIEW (OUTPATIENT)
Age: 70
End: 2025-07-31

## 2025-07-31 ENCOUNTER — OUTPATIENT (OUTPATIENT)
Dept: OUTPATIENT SERVICES | Facility: HOSPITAL | Age: 70
LOS: 1 days | End: 2025-07-31
Payer: MEDICARE

## 2025-07-31 VITALS
DIASTOLIC BLOOD PRESSURE: 81 MMHG | HEIGHT: 68 IN | SYSTOLIC BLOOD PRESSURE: 118 MMHG | WEIGHT: 213 LBS | HEART RATE: 81 BPM | BODY MASS INDEX: 32.28 KG/M2

## 2025-07-31 DIAGNOSIS — E78.5 HYPERLIPIDEMIA, UNSPECIFIED: ICD-10-CM

## 2025-07-31 DIAGNOSIS — E11.9 TYPE 2 DIABETES MELLITUS W/OUT COMPLICATIONS: ICD-10-CM

## 2025-07-31 DIAGNOSIS — Z71.3 DIETARY COUNSELING AND SURVEILLANCE: ICD-10-CM

## 2025-07-31 DIAGNOSIS — Z71.82 EXERCISE COUNSELING: ICD-10-CM

## 2025-07-31 DIAGNOSIS — E66.9 OBESITY, UNSPECIFIED: ICD-10-CM

## 2025-07-31 DIAGNOSIS — C18.9 MALIGNANT NEOPLASM OF COLON, UNSPECIFIED: ICD-10-CM

## 2025-07-31 DIAGNOSIS — Z00.8 ENCOUNTER FOR OTHER GENERAL EXAMINATION: ICD-10-CM

## 2025-07-31 DIAGNOSIS — Z90.2 ACQUIRED ABSENCE OF LUNG [PART OF]: Chronic | ICD-10-CM

## 2025-07-31 DIAGNOSIS — Z90.49 ACQUIRED ABSENCE OF OTHER SPECIFIED PARTS OF DIGESTIVE TRACT: Chronic | ICD-10-CM

## 2025-07-31 PROCEDURE — 76700 US EXAM ABDOM COMPLETE: CPT | Mod: 26

## 2025-07-31 PROCEDURE — 99214 OFFICE O/P EST MOD 30 MIN: CPT

## 2025-07-31 PROCEDURE — 76700 US EXAM ABDOM COMPLETE: CPT

## 2025-08-01 ENCOUNTER — APPOINTMENT (OUTPATIENT)
Dept: CARDIOLOGY | Facility: CLINIC | Age: 70
End: 2025-08-01
Payer: MEDICARE

## 2025-08-01 DIAGNOSIS — E78.5 HYPERLIPIDEMIA, UNSPECIFIED: ICD-10-CM

## 2025-08-01 DIAGNOSIS — G47.33 OBSTRUCTIVE SLEEP APNEA (ADULT) (PEDIATRIC): ICD-10-CM

## 2025-08-01 DIAGNOSIS — C18.9 MALIGNANT NEOPLASM OF COLON, UNSPECIFIED: ICD-10-CM

## 2025-08-01 DIAGNOSIS — I10 ESSENTIAL (PRIMARY) HYPERTENSION: ICD-10-CM

## 2025-08-01 PROCEDURE — 93320 DOPPLER ECHO COMPLETE: CPT

## 2025-08-01 PROCEDURE — 93351 STRESS TTE COMPLETE: CPT

## 2025-08-01 PROCEDURE — 93325 DOPPLER ECHO COLOR FLOW MAPG: CPT

## 2025-08-03 PROBLEM — G47.33 OSA (OBSTRUCTIVE SLEEP APNEA): Status: ACTIVE | Noted: 2025-07-21

## 2025-08-12 PROBLEM — Z71.3 DIETARY COUNSELING: Status: ACTIVE | Noted: 2025-07-09

## 2025-08-12 PROBLEM — E66.9 OBESITY, UNSPECIFIED CLASS, UNSPECIFIED OBESITY TYPE, UNSPECIFIED WHETHER SERIOUS COMORBIDITY PRESENT: Status: ACTIVE | Noted: 2025-07-09

## 2025-08-12 PROBLEM — Z71.82 EXERCISE COUNSELING: Status: ACTIVE | Noted: 2025-07-09

## 2025-08-19 ENCOUNTER — OUTPATIENT (OUTPATIENT)
Dept: OUTPATIENT SERVICES | Facility: HOSPITAL | Age: 70
LOS: 1 days | Discharge: HOME | End: 2025-08-19
Payer: MEDICARE

## 2025-08-19 ENCOUNTER — APPOINTMENT (OUTPATIENT)
Dept: SLEEP CENTER | Facility: HOSPITAL | Age: 70
End: 2025-08-19

## 2025-08-19 DIAGNOSIS — Z90.2 ACQUIRED ABSENCE OF LUNG [PART OF]: Chronic | ICD-10-CM

## 2025-08-19 DIAGNOSIS — Z90.49 ACQUIRED ABSENCE OF OTHER SPECIFIED PARTS OF DIGESTIVE TRACT: Chronic | ICD-10-CM

## 2025-08-19 DIAGNOSIS — G47.33 OBSTRUCTIVE SLEEP APNEA (ADULT) (PEDIATRIC): ICD-10-CM

## 2025-08-19 PROCEDURE — 95811 POLYSOM 6/>YRS CPAP 4/> PARM: CPT | Mod: 26

## 2025-08-19 PROCEDURE — 95811 POLYSOM 6/>YRS CPAP 4/> PARM: CPT

## 2025-08-20 VITALS — WEIGHT: 213 LBS | BODY MASS INDEX: 32.39 KG/M2

## 2025-08-21 DIAGNOSIS — G47.33 OBSTRUCTIVE SLEEP APNEA (ADULT) (PEDIATRIC): ICD-10-CM

## 2025-09-11 ENCOUNTER — APPOINTMENT (OUTPATIENT)
Dept: PULMONOLOGY | Facility: CLINIC | Age: 70
End: 2025-09-11
Payer: MEDICARE

## 2025-09-11 DIAGNOSIS — G47.33 OBSTRUCTIVE SLEEP APNEA (ADULT) (PEDIATRIC): ICD-10-CM

## 2025-09-11 PROCEDURE — G2211 COMPLEX E/M VISIT ADD ON: CPT | Mod: 2W

## 2025-09-11 PROCEDURE — 99212 OFFICE O/P EST SF 10 MIN: CPT | Mod: 2W

## 2025-09-17 VITALS — WEIGHT: 211 LBS | BODY MASS INDEX: 32.08 KG/M2
